# Patient Record
Sex: FEMALE | Race: WHITE | NOT HISPANIC OR LATINO
[De-identification: names, ages, dates, MRNs, and addresses within clinical notes are randomized per-mention and may not be internally consistent; named-entity substitution may affect disease eponyms.]

---

## 2017-02-28 ENCOUNTER — APPOINTMENT (OUTPATIENT)
Dept: INTERNAL MEDICINE | Facility: CLINIC | Age: 82
End: 2017-02-28

## 2017-02-28 VITALS
HEART RATE: 74 BPM | RESPIRATION RATE: 14 BRPM | SYSTOLIC BLOOD PRESSURE: 130 MMHG | DIASTOLIC BLOOD PRESSURE: 80 MMHG | TEMPERATURE: 98.3 F | HEIGHT: 68 IN | OXYGEN SATURATION: 98 %

## 2017-02-28 DIAGNOSIS — J06.9 ACUTE UPPER RESPIRATORY INFECTION, UNSPECIFIED: ICD-10-CM

## 2017-03-21 ENCOUNTER — APPOINTMENT (OUTPATIENT)
Dept: INTERNAL MEDICINE | Facility: CLINIC | Age: 82
End: 2017-03-21

## 2017-03-21 VITALS
RESPIRATION RATE: 14 BRPM | DIASTOLIC BLOOD PRESSURE: 60 MMHG | HEART RATE: 91 BPM | SYSTOLIC BLOOD PRESSURE: 110 MMHG | HEIGHT: 68 IN | OXYGEN SATURATION: 99 %

## 2017-03-21 DIAGNOSIS — R07.81 PLEURODYNIA: ICD-10-CM

## 2017-06-30 ENCOUNTER — APPOINTMENT (OUTPATIENT)
Dept: INTERNAL MEDICINE | Facility: CLINIC | Age: 82
End: 2017-06-30

## 2017-06-30 VITALS
BODY MASS INDEX: 21.98 KG/M2 | HEIGHT: 68 IN | DIASTOLIC BLOOD PRESSURE: 80 MMHG | WEIGHT: 145 LBS | RESPIRATION RATE: 14 BRPM | HEART RATE: 73 BPM | SYSTOLIC BLOOD PRESSURE: 130 MMHG | OXYGEN SATURATION: 97 % | TEMPERATURE: 98 F

## 2017-06-30 DIAGNOSIS — D64.9 ANEMIA, UNSPECIFIED: ICD-10-CM

## 2017-06-30 DIAGNOSIS — F03.90 UNSPECIFIED DEMENTIA W/OUT BEHAVIORAL DISTURBANCE: ICD-10-CM

## 2017-06-30 DIAGNOSIS — I25.10 ATHEROSCLEROTIC HEART DISEASE OF NATIVE CORONARY ARTERY W/OUT ANGINA PECTORIS: ICD-10-CM

## 2017-06-30 DIAGNOSIS — R60.0 LOCALIZED EDEMA: ICD-10-CM

## 2017-06-30 DIAGNOSIS — E78.5 HYPERLIPIDEMIA, UNSPECIFIED: ICD-10-CM

## 2017-10-22 ENCOUNTER — EMERGENCY (EMERGENCY)
Facility: HOSPITAL | Age: 82
LOS: 1 days | Discharge: HOME | End: 2017-10-22
Attending: EMERGENCY MEDICINE
Payer: MEDICARE

## 2017-10-22 VITALS
OXYGEN SATURATION: 98 % | HEART RATE: 62 BPM | SYSTOLIC BLOOD PRESSURE: 124 MMHG | DIASTOLIC BLOOD PRESSURE: 81 MMHG | RESPIRATION RATE: 14 BRPM | TEMPERATURE: 98 F

## 2017-10-22 VITALS
OXYGEN SATURATION: 95 % | DIASTOLIC BLOOD PRESSURE: 64 MMHG | TEMPERATURE: 97 F | SYSTOLIC BLOOD PRESSURE: 102 MMHG | RESPIRATION RATE: 16 BRPM | HEIGHT: 68 IN | WEIGHT: 190.04 LBS | HEART RATE: 82 BPM

## 2017-10-22 LAB
ALBUMIN SERPL ELPH-MCNC: 2.7 G/DL — LOW (ref 3.3–5)
ALP SERPL-CCNC: 60 U/L — SIGNIFICANT CHANGE UP (ref 30–120)
ALT FLD-CCNC: 23 U/L DA — SIGNIFICANT CHANGE UP (ref 10–60)
ANION GAP SERPL CALC-SCNC: 12 MMOL/L — SIGNIFICANT CHANGE UP (ref 5–17)
APTT BLD: 29.2 SEC — SIGNIFICANT CHANGE UP (ref 27.5–37.4)
AST SERPL-CCNC: 29 U/L — SIGNIFICANT CHANGE UP (ref 10–40)
BASOPHILS # BLD AUTO: 0.1 K/UL — SIGNIFICANT CHANGE UP (ref 0–0.2)
BASOPHILS NFR BLD AUTO: 0.8 % — SIGNIFICANT CHANGE UP (ref 0–2)
BILIRUB SERPL-MCNC: 1.1 MG/DL — SIGNIFICANT CHANGE UP (ref 0.2–1.2)
BUN SERPL-MCNC: 37 MG/DL — HIGH (ref 7–23)
CALCIUM SERPL-MCNC: 9.1 MG/DL — SIGNIFICANT CHANGE UP (ref 8.4–10.5)
CHLORIDE SERPL-SCNC: 102 MMOL/L — SIGNIFICANT CHANGE UP (ref 96–108)
CK SERPL-CCNC: 231 U/L — HIGH (ref 26–192)
CO2 SERPL-SCNC: 23 MMOL/L — SIGNIFICANT CHANGE UP (ref 22–31)
CREAT SERPL-MCNC: 1.36 MG/DL — HIGH (ref 0.5–1.3)
EOSINOPHIL # BLD AUTO: 0 K/UL — SIGNIFICANT CHANGE UP (ref 0–0.5)
EOSINOPHIL NFR BLD AUTO: 0.5 % — SIGNIFICANT CHANGE UP (ref 0–6)
GLUCOSE SERPL-MCNC: 135 MG/DL — HIGH (ref 70–99)
HCT VFR BLD CALC: 34.6 % — SIGNIFICANT CHANGE UP (ref 34.5–45)
HGB BLD-MCNC: 12.2 G/DL — SIGNIFICANT CHANGE UP (ref 11.5–15.5)
LIDOCAIN IGE QN: 175 U/L — SIGNIFICANT CHANGE UP (ref 73–393)
LYMPHOCYTES # BLD AUTO: 0.8 K/UL — LOW (ref 1–3.3)
LYMPHOCYTES # BLD AUTO: 7.3 % — LOW (ref 13–44)
MCHC RBC-ENTMCNC: 33.8 PG — SIGNIFICANT CHANGE UP (ref 27–34)
MCHC RBC-ENTMCNC: 35.1 GM/DL — SIGNIFICANT CHANGE UP (ref 32–36)
MCV RBC AUTO: 96 FL — SIGNIFICANT CHANGE UP (ref 80–100)
MONOCYTES # BLD AUTO: 1.4 K/UL — HIGH (ref 0–0.9)
MONOCYTES NFR BLD AUTO: 13.2 % — SIGNIFICANT CHANGE UP (ref 2–14)
NEUTROPHILS # BLD AUTO: 8.2 K/UL — HIGH (ref 1.8–7.4)
NEUTROPHILS NFR BLD AUTO: 78.2 % — HIGH (ref 43–77)
PLATELET # BLD AUTO: 166 K/UL — SIGNIFICANT CHANGE UP (ref 150–400)
POTASSIUM SERPL-MCNC: 3.9 MMOL/L — SIGNIFICANT CHANGE UP (ref 3.5–5.3)
POTASSIUM SERPL-SCNC: 3.9 MMOL/L — SIGNIFICANT CHANGE UP (ref 3.5–5.3)
PROT SERPL-MCNC: 6.2 G/DL — SIGNIFICANT CHANGE UP (ref 6–8.3)
RBC # BLD: 3.61 M/UL — LOW (ref 3.8–5.2)
RBC # FLD: 12.3 % — SIGNIFICANT CHANGE UP (ref 10.3–14.5)
SODIUM SERPL-SCNC: 137 MMOL/L — SIGNIFICANT CHANGE UP (ref 135–145)
TROPONIN I SERPL-MCNC: 0.04 NG/ML — SIGNIFICANT CHANGE UP (ref 0.02–0.06)
WBC # BLD: 10.5 K/UL — SIGNIFICANT CHANGE UP (ref 3.8–10.5)
WBC # FLD AUTO: 10.5 K/UL — SIGNIFICANT CHANGE UP (ref 3.8–10.5)

## 2017-10-22 PROCEDURE — 85730 THROMBOPLASTIN TIME PARTIAL: CPT

## 2017-10-22 PROCEDURE — 83690 ASSAY OF LIPASE: CPT

## 2017-10-22 PROCEDURE — 84484 ASSAY OF TROPONIN QUANT: CPT

## 2017-10-22 PROCEDURE — 85027 COMPLETE CBC AUTOMATED: CPT

## 2017-10-22 PROCEDURE — 36415 COLL VENOUS BLD VENIPUNCTURE: CPT

## 2017-10-22 PROCEDURE — 82550 ASSAY OF CK (CPK): CPT

## 2017-10-22 PROCEDURE — 99284 EMERGENCY DEPT VISIT MOD MDM: CPT

## 2017-10-22 PROCEDURE — 99283 EMERGENCY DEPT VISIT LOW MDM: CPT

## 2017-10-22 PROCEDURE — 80053 COMPREHEN METABOLIC PANEL: CPT

## 2017-10-22 RX ORDER — SODIUM CHLORIDE 9 MG/ML
3 INJECTION INTRAMUSCULAR; INTRAVENOUS; SUBCUTANEOUS ONCE
Qty: 0 | Refills: 0 | Status: COMPLETED | OUTPATIENT
Start: 2017-10-22 | End: 2017-10-22

## 2017-10-22 RX ORDER — SODIUM CHLORIDE 9 MG/ML
1000 INJECTION INTRAMUSCULAR; INTRAVENOUS; SUBCUTANEOUS ONCE
Qty: 0 | Refills: 0 | Status: COMPLETED | OUTPATIENT
Start: 2017-10-22 | End: 2017-10-22

## 2017-10-22 RX ADMIN — SODIUM CHLORIDE 3 MILLILITER(S): 9 INJECTION INTRAMUSCULAR; INTRAVENOUS; SUBCUTANEOUS at 16:49

## 2017-10-22 RX ADMIN — SODIUM CHLORIDE 5 MILLILITER(S): 9 INJECTION INTRAMUSCULAR; INTRAVENOUS; SUBCUTANEOUS at 15:06

## 2017-10-22 NOTE — ED ADULT NURSE NOTE - OBJECTIVE STATEMENT
89 year old female BIBA from home after personal aide called EMS as she states patient has had 4 days of diarrhea  Pt denies pain  no visisble stools noted in ER  Vital signs stable  afebrile   Abdomen soft non-distended  skin color WNL

## 2017-10-22 NOTE — ED PROVIDER NOTE - OBJECTIVE STATEMENT
89 y/o F pt with history of dementia, hyperlipidemia presents to the ED c/o diarrhea x4 days. Pt's friend tried giving her Imodium, with no relief, and states that yesterday pt was very lethargic. Denies vomiting, fever, abdominal pain. No further complaints at this time. History is limited due to pt's dementia.  PCP: Dr. Wetzel

## 2017-10-22 NOTE — ED PROVIDER NOTE - MEDICAL DECISION MAKING DETAILS
evaluate the hydration status of the patient r/o infectious diarrhea corollate with lab result and clinical finding.

## 2017-10-29 ENCOUNTER — INPATIENT (INPATIENT)
Facility: HOSPITAL | Age: 82
LOS: 4 days | Discharge: HOME | DRG: 372 | End: 2017-11-03
Attending: FAMILY MEDICINE | Admitting: FAMILY MEDICINE
Payer: MEDICARE

## 2017-10-29 VITALS
TEMPERATURE: 100 F | HEART RATE: 108 BPM | OXYGEN SATURATION: 100 % | WEIGHT: 175.05 LBS | DIASTOLIC BLOOD PRESSURE: 63 MMHG | RESPIRATION RATE: 18 BRPM | SYSTOLIC BLOOD PRESSURE: 110 MMHG | HEIGHT: 72 IN

## 2017-10-29 DIAGNOSIS — N39.0 URINARY TRACT INFECTION, SITE NOT SPECIFIED: ICD-10-CM

## 2017-10-29 DIAGNOSIS — N17.9 ACUTE KIDNEY FAILURE, UNSPECIFIED: ICD-10-CM

## 2017-10-29 DIAGNOSIS — I48.91 UNSPECIFIED ATRIAL FIBRILLATION: ICD-10-CM

## 2017-10-29 DIAGNOSIS — R62.7 ADULT FAILURE TO THRIVE: ICD-10-CM

## 2017-10-29 LAB
ALBUMIN SERPL ELPH-MCNC: 2.7 G/DL — LOW (ref 3.3–5)
ALP SERPL-CCNC: 68 U/L — SIGNIFICANT CHANGE UP (ref 30–120)
ALT FLD-CCNC: 31 U/L DA — SIGNIFICANT CHANGE UP (ref 10–60)
ANION GAP SERPL CALC-SCNC: 15 MMOL/L — SIGNIFICANT CHANGE UP (ref 5–17)
APPEARANCE UR: ABNORMAL
APTT BLD: 26.8 SEC — LOW (ref 27.5–37.4)
AST SERPL-CCNC: 32 U/L — SIGNIFICANT CHANGE UP (ref 10–40)
BACTERIA # UR AUTO: ABNORMAL
BASOPHILS # BLD AUTO: 0 K/UL — SIGNIFICANT CHANGE UP (ref 0–0.2)
BASOPHILS NFR BLD AUTO: 1 % — SIGNIFICANT CHANGE UP (ref 0–2)
BILIRUB SERPL-MCNC: 0.9 MG/DL — SIGNIFICANT CHANGE UP (ref 0.2–1.2)
BILIRUB UR-MCNC: ABNORMAL
BUN SERPL-MCNC: 42 MG/DL — HIGH (ref 7–23)
CALCIUM SERPL-MCNC: 8.7 MG/DL — SIGNIFICANT CHANGE UP (ref 8.4–10.5)
CHLORIDE SERPL-SCNC: 104 MMOL/L — SIGNIFICANT CHANGE UP (ref 96–108)
CK MB CFR SERPL CALC: 3 NG/ML — SIGNIFICANT CHANGE UP (ref 0–3.6)
CO2 SERPL-SCNC: 19 MMOL/L — LOW (ref 22–31)
COLOR SPEC: YELLOW — SIGNIFICANT CHANGE UP
CREAT SERPL-MCNC: 1.6 MG/DL — HIGH (ref 0.5–1.3)
DIFF PNL FLD: ABNORMAL
EOSINOPHIL # BLD AUTO: 0.1 K/UL — SIGNIFICANT CHANGE UP (ref 0–0.5)
EOSINOPHIL NFR BLD AUTO: 0.4 % — SIGNIFICANT CHANGE UP (ref 0–6)
EPI CELLS # UR: ABNORMAL
GIANT PLATELETS BLD QL SMEAR: PRESENT — SIGNIFICANT CHANGE UP
GLUCOSE SERPL-MCNC: 160 MG/DL — HIGH (ref 70–99)
GLUCOSE UR QL: NEGATIVE MG/DL — SIGNIFICANT CHANGE UP
GRAN CASTS # UR COMP ASSIST: ABNORMAL /LPF
HCT VFR BLD CALC: 38 % — SIGNIFICANT CHANGE UP (ref 34.5–45)
HGB BLD-MCNC: 13 G/DL — SIGNIFICANT CHANGE UP (ref 11.5–15.5)
HYALINE CASTS # UR AUTO: ABNORMAL /LPF
INR BLD: 1.22 RATIO — HIGH (ref 0.88–1.16)
KETONES UR-MCNC: ABNORMAL
LACTATE SERPL-SCNC: 1.5 MMOL/L — SIGNIFICANT CHANGE UP (ref 0.7–2)
LACTATE SERPL-SCNC: 2.5 MMOL/L — HIGH (ref 0.7–2)
LEUKOCYTE ESTERASE UR-ACNC: ABNORMAL
LYMPHOCYTES # BLD AUTO: 0.7 K/UL — LOW (ref 1–3.3)
LYMPHOCYTES # BLD AUTO: 2 % — LOW (ref 13–44)
MCHC RBC-ENTMCNC: 33 PG — SIGNIFICANT CHANGE UP (ref 27–34)
MCHC RBC-ENTMCNC: 34.2 GM/DL — SIGNIFICANT CHANGE UP (ref 32–36)
MCV RBC AUTO: 96.7 FL — SIGNIFICANT CHANGE UP (ref 80–100)
METAMYELOCYTES # FLD: 2 % — HIGH (ref 0–0)
MONOCYTES # BLD AUTO: 0.8 K/UL — SIGNIFICANT CHANGE UP (ref 0–0.9)
MONOCYTES NFR BLD AUTO: 2 % — SIGNIFICANT CHANGE UP (ref 2–14)
NEUTROPHILS # BLD AUTO: 27.3 K/UL — HIGH (ref 1.8–7.4)
NEUTROPHILS NFR BLD AUTO: 80 % — HIGH (ref 43–77)
NEUTS BAND # BLD: 13 % — HIGH (ref 0–8)
NITRITE UR-MCNC: NEGATIVE — SIGNIFICANT CHANGE UP
PH UR: 5 — SIGNIFICANT CHANGE UP (ref 5–8)
PLAT MORPH BLD: NORMAL — SIGNIFICANT CHANGE UP
PLATELET # BLD AUTO: 233 K/UL — SIGNIFICANT CHANGE UP (ref 150–400)
POTASSIUM SERPL-MCNC: 3.9 MMOL/L — SIGNIFICANT CHANGE UP (ref 3.5–5.3)
POTASSIUM SERPL-SCNC: 3.9 MMOL/L — SIGNIFICANT CHANGE UP (ref 3.5–5.3)
PROT SERPL-MCNC: 5.9 G/DL — LOW (ref 6–8.3)
PROT UR-MCNC: 30 MG/DL
PROTHROM AB SERPL-ACNC: 13.4 SEC — HIGH (ref 9.8–12.7)
RBC # BLD: 3.92 M/UL — SIGNIFICANT CHANGE UP (ref 3.8–5.2)
RBC # FLD: 12.8 % — SIGNIFICANT CHANGE UP (ref 10.3–14.5)
RBC BLD AUTO: NORMAL — SIGNIFICANT CHANGE UP
RBC CASTS # UR COMP ASSIST: ABNORMAL /HPF (ref 0–4)
SODIUM SERPL-SCNC: 138 MMOL/L — SIGNIFICANT CHANGE UP (ref 135–145)
SP GR SPEC: 1.01 — SIGNIFICANT CHANGE UP (ref 1.01–1.02)
TOXIC GRANULES BLD QL SMEAR: PRESENT — SIGNIFICANT CHANGE UP
TROPONIN I SERPL-MCNC: 0.08 NG/ML — HIGH (ref 0.02–0.06)
TROPONIN I SERPL-MCNC: 0.16 NG/ML — HIGH (ref 0.02–0.06)
UROBILINOGEN FLD QL: 4 MG/DL
WBC # BLD: 29 K/UL — HIGH (ref 3.8–10.5)
WBC # FLD AUTO: 29 K/UL — HIGH (ref 3.8–10.5)
WBC UR QL: SIGNIFICANT CHANGE UP

## 2017-10-29 PROCEDURE — 99285 EMERGENCY DEPT VISIT HI MDM: CPT

## 2017-10-29 PROCEDURE — 99223 1ST HOSP IP/OBS HIGH 75: CPT | Mod: AI

## 2017-10-29 PROCEDURE — 71010: CPT | Mod: 26

## 2017-10-29 PROCEDURE — 70450 CT HEAD/BRAIN W/O DYE: CPT | Mod: 26

## 2017-10-29 PROCEDURE — 93010 ELECTROCARDIOGRAM REPORT: CPT

## 2017-10-29 PROCEDURE — 99231 SBSQ HOSP IP/OBS SF/LOW 25: CPT | Mod: 25

## 2017-10-29 RX ORDER — CEFTRIAXONE 500 MG/1
1 INJECTION, POWDER, FOR SOLUTION INTRAMUSCULAR; INTRAVENOUS EVERY 24 HOURS
Qty: 0 | Refills: 0 | Status: DISCONTINUED | OUTPATIENT
Start: 2017-10-30 | End: 2017-11-01

## 2017-10-29 RX ORDER — ATORVASTATIN CALCIUM 80 MG/1
1 TABLET, FILM COATED ORAL
Qty: 0 | Refills: 0 | COMMUNITY

## 2017-10-29 RX ORDER — METOPROLOL TARTRATE 50 MG
5 TABLET ORAL ONCE
Qty: 0 | Refills: 0 | Status: COMPLETED | OUTPATIENT
Start: 2017-10-29 | End: 2017-10-29

## 2017-10-29 RX ORDER — TIMOLOL 0.5 %
1 DROPS OPHTHALMIC (EYE)
Qty: 0 | Refills: 0 | Status: DISCONTINUED | OUTPATIENT
Start: 2017-10-29 | End: 2017-11-03

## 2017-10-29 RX ORDER — LATANOPROST 0.05 MG/ML
1 SOLUTION/ DROPS OPHTHALMIC; TOPICAL AT BEDTIME
Qty: 0 | Refills: 0 | Status: DISCONTINUED | OUTPATIENT
Start: 2017-10-29 | End: 2017-11-03

## 2017-10-29 RX ORDER — ONDANSETRON 8 MG/1
4 TABLET, FILM COATED ORAL EVERY 6 HOURS
Qty: 0 | Refills: 0 | Status: DISCONTINUED | OUTPATIENT
Start: 2017-10-29 | End: 2017-11-03

## 2017-10-29 RX ORDER — CEFTRIAXONE 500 MG/1
1 INJECTION, POWDER, FOR SOLUTION INTRAMUSCULAR; INTRAVENOUS ONCE
Qty: 0 | Refills: 0 | Status: COMPLETED | OUTPATIENT
Start: 2017-10-29 | End: 2017-10-29

## 2017-10-29 RX ORDER — METOPROLOL TARTRATE 50 MG
5 TABLET ORAL EVERY 6 HOURS
Qty: 0 | Refills: 0 | Status: DISCONTINUED | OUTPATIENT
Start: 2017-10-29 | End: 2017-10-30

## 2017-10-29 RX ORDER — TIMOLOL 0.5 %
0 DROPS OPHTHALMIC (EYE)
Qty: 0 | Refills: 0 | COMMUNITY

## 2017-10-29 RX ORDER — PANTOPRAZOLE SODIUM 20 MG/1
40 TABLET, DELAYED RELEASE ORAL
Qty: 0 | Refills: 0 | Status: DISCONTINUED | OUTPATIENT
Start: 2017-10-29 | End: 2017-10-30

## 2017-10-29 RX ORDER — SODIUM CHLORIDE 9 MG/ML
1000 INJECTION, SOLUTION INTRAVENOUS
Qty: 0 | Refills: 0 | Status: DISCONTINUED | OUTPATIENT
Start: 2017-10-29 | End: 2017-10-29

## 2017-10-29 RX ORDER — ATORVASTATIN CALCIUM 80 MG/1
40 TABLET, FILM COATED ORAL AT BEDTIME
Qty: 0 | Refills: 0 | Status: DISCONTINUED | OUTPATIENT
Start: 2017-10-29 | End: 2017-11-03

## 2017-10-29 RX ORDER — LATANOPROST 0.05 MG/ML
0 SOLUTION/ DROPS OPHTHALMIC; TOPICAL
Qty: 0 | Refills: 0 | COMMUNITY

## 2017-10-29 RX ORDER — ASCORBIC ACID 60 MG
1 TABLET,CHEWABLE ORAL
Qty: 0 | Refills: 0 | COMMUNITY

## 2017-10-29 RX ORDER — SODIUM CHLORIDE 9 MG/ML
1000 INJECTION, SOLUTION INTRAVENOUS
Qty: 0 | Refills: 0 | Status: DISCONTINUED | OUTPATIENT
Start: 2017-10-29 | End: 2017-10-30

## 2017-10-29 RX ORDER — HEPARIN SODIUM 5000 [USP'U]/ML
5000 INJECTION INTRAVENOUS; SUBCUTANEOUS EVERY 12 HOURS
Qty: 0 | Refills: 0 | Status: DISCONTINUED | OUTPATIENT
Start: 2017-10-29 | End: 2017-10-29

## 2017-10-29 RX ORDER — SODIUM CHLORIDE 9 MG/ML
1000 INJECTION INTRAMUSCULAR; INTRAVENOUS; SUBCUTANEOUS
Qty: 0 | Refills: 0 | Status: COMPLETED | OUTPATIENT
Start: 2017-10-29 | End: 2017-10-29

## 2017-10-29 RX ORDER — MULTIVIT-MIN/FERROUS GLUCONATE 9 MG/15 ML
1 LIQUID (ML) ORAL
Qty: 0 | Refills: 0 | COMMUNITY

## 2017-10-29 RX ORDER — ASPIRIN/CALCIUM CARB/MAGNESIUM 324 MG
81 TABLET ORAL DAILY
Qty: 0 | Refills: 0 | Status: DISCONTINUED | OUTPATIENT
Start: 2017-10-29 | End: 2017-10-29

## 2017-10-29 RX ADMIN — PANTOPRAZOLE SODIUM 40 MILLIGRAM(S): 20 TABLET, DELAYED RELEASE ORAL at 17:38

## 2017-10-29 RX ADMIN — Medication 5 MILLIGRAM(S): at 18:19

## 2017-10-29 RX ADMIN — SODIUM CHLORIDE 1000 MILLILITER(S): 9 INJECTION INTRAMUSCULAR; INTRAVENOUS; SUBCUTANEOUS at 12:36

## 2017-10-29 RX ADMIN — Medication 81 MILLIGRAM(S): at 17:38

## 2017-10-29 RX ADMIN — CEFTRIAXONE 100 GRAM(S): 500 INJECTION, POWDER, FOR SOLUTION INTRAMUSCULAR; INTRAVENOUS at 12:04

## 2017-10-29 RX ADMIN — ATORVASTATIN CALCIUM 40 MILLIGRAM(S): 80 TABLET, FILM COATED ORAL at 22:02

## 2017-10-29 RX ADMIN — Medication 5 MILLIGRAM(S): at 14:50

## 2017-10-29 RX ADMIN — SODIUM CHLORIDE 1000 MILLILITER(S): 9 INJECTION INTRAMUSCULAR; INTRAVENOUS; SUBCUTANEOUS at 11:48

## 2017-10-29 RX ADMIN — Medication 1 DROP(S): at 18:20

## 2017-10-29 RX ADMIN — LATANOPROST 1 DROP(S): 0.05 SOLUTION/ DROPS OPHTHALMIC; TOPICAL at 22:02

## 2017-10-29 RX ADMIN — SODIUM CHLORIDE 100 MILLILITER(S): 9 INJECTION, SOLUTION INTRAVENOUS at 20:14

## 2017-10-29 RX ADMIN — SODIUM CHLORIDE 1000 MILLILITER(S): 9 INJECTION INTRAMUSCULAR; INTRAVENOUS; SUBCUTANEOUS at 11:47

## 2017-10-29 RX ADMIN — HEPARIN SODIUM 5000 UNIT(S): 5000 INJECTION INTRAVENOUS; SUBCUTANEOUS at 18:20

## 2017-10-29 RX ADMIN — SODIUM CHLORIDE 100 MILLILITER(S): 9 INJECTION, SOLUTION INTRAVENOUS at 18:20

## 2017-10-29 NOTE — ED ADULT NURSE NOTE - INTEGUMENTARY WDL
Color consistent with ethnicity/race, warm, dry intact, resilient. decubitus as below and in primary note

## 2017-10-29 NOTE — H&P ADULT - HISTORY OF PRESENT ILLNESS
Patient is 89 yo female with hx of CAD s/P stent, and dementia presenting with failure to thrive and generalized weakness.  afebrile.  Patient appears to be confused, and unable to obtain complete hx or ROS.  HX was mainly from son, and daughter. Patient is 89 yo female with hx of CAD s/P stent, and dementia presenting with failure to thrive and generalized weakness.  afebrile.  Patient appears to be confused, and unable to obtain complete hx or ROS.  HX was mainly from son, and daughter.    As per daughter, patient had diarrhea 2 weeks ago which improved, and almost resolved.

## 2017-10-29 NOTE — ED ADULT NURSE REASSESSMENT NOTE - NS ED NURSE REASSESS COMMENT FT1
from [facility] in [facility] with pt and family pt pending bed
attempted to feed pt but locks lips and pushing food away  and  yells go away  attempted to cajole but still refusing food
bladder scan used 25 cc only endorsed to nights
pt clean and dry pillow to side pt keeps rolling to right side pt fed applesauce and tolerated monitor a fib pt pending inpt bed
pt cleaned and changed  lotion to buttocks incontinent of feces lungs clear and equal b/l ascultation pt pending admission iv intact no s/s infiltration monitor a fib 90 to 100's
pt comfortable skin warm and dry denies any pains pt pending admission awaiting fluid bolus before repeating lactate
pt incontinent of feces formed green/brown pt cleaned and changed and lotion applied re positioned attempted to feed and spits out pt pending admission awaiting inpt bed  pt denies pains
pt returned from xray ekg rhythm changed and ekg repeated  a fib 120's pt straight cathed for urine concentrate urine  sent to lab pt pending labs and dispo
blood in mouth oral suction done seen by dr dimas admitted to icu

## 2017-10-29 NOTE — H&P ADULT - RS GEN PE MLT RESP DETAILS PC
good air movement/breath sounds equal/respirations non-labored/airway patent/clear to auscultation bilaterally/normal

## 2017-10-29 NOTE — H&P ADULT - GASTROINTESTINAL DETAILS
no bruit/nontender/no masses palpable/soft/bowel sounds normal/no rebound tenderness/no distention/normal

## 2017-10-29 NOTE — ED ADULT NURSE NOTE - FALL HARM RISK
other/age(85 years old or older)/mental status/bones(Osteoporosis,prev fx,steroid use,metastatic bone ca

## 2017-10-29 NOTE — CHART NOTE - NSCHARTNOTEFT_GEN_A_CORE
RN called to evaluate episode of oral bleed - etiology unclear. seems self-limited. pt refusing full oral exam.   limited exam showed some dried blood on tongue, poor dentition. patient not cooperative with tongue depressor exam due to dementia.   stopped aspirin/heparin for now. aspiration precautions. continue NPO.  SP[facility] monitoring.  d/w RN.  patient is DNR.

## 2017-10-29 NOTE — ED PROVIDER NOTE - CONSTITUTIONAL, MLM
normal... Well appearing, well nourished, awake, lethargic, oriented to person only and in no apparent distress.

## 2017-10-29 NOTE — ED ADULT NURSE NOTE - OBJECTIVE STATEMENT
pt brought in for weakness per ems incontinent of feces and urine on arrival sacrum redness with tiny cracks to skin and purplish excoriation to inner buttocks cheeks pt cleaned and changed and lotion to buttocks and genitalia pt with b/l +1 pedal edema pt brought in for weakness per ems incontinent of feces and urine on arrival sacrum redness with tiny cracks to skin and purplish excoriation to inner buttocks cheeks pt cleaned and changed and lotion to buttocks and genitalia pt with b/l +1 pedal edema to ankles discoloration  to both lower legs left more than right bruising to right forearm no pains  abrasion to left eyebrow perrl pt aaox2 not to date and situation skin warm and dry no resp distress lungs clear and equal b/l ascultation abd soft non tender + bs pt denies pains

## 2017-10-29 NOTE — ED PROVIDER NOTE - MEDICAL DECISION MAKING DETAILS
89 yo female with dementia with severe weakness today. PE nonfocal. Plan - labs, cxr, ct brain, ekg, IV fluids, likely admit.

## 2017-10-29 NOTE — ED PROVIDER NOTE - OBJECTIVE STATEMENT
Brought by ambulance from home with co weakness and almost passed out today. Pt with dementia cannot give hx. Denies any symptoms when asked. Recently seen in ER for diarrhea.

## 2017-10-29 NOTE — H&P ADULT - PROBLEM SELECTOR PLAN 3
Will start patient on PO Cardizem 30 mg po q6hrs for rate control.  poor candidate for long term anticoagulation due to risk of fall, and advanced age.  Continue with ASA.  Obtain [facility] TSH, and cardiology consult Will start patient on IV metoprolol q6hrs for rate control.  poor candidate for long term anticoagulation due to risk of fall, and advanced age.  Continue with ASA.  Obtain [facility] TSH, and cardiology consult

## 2017-10-29 NOTE — ED PROVIDER NOTE - MUSCULOSKELETAL, MLM
Spine appears normal, range of motion is not limited, no muscle or joint tenderness. Pitting edema both lower legs.

## 2017-10-29 NOTE — H&P ADULT - ATTENDING COMMENTS
Plan of care was discussed with son/daughter/HCP in great details, All questions were answered to their satisfication.  Seems to understand, and in agreement  Code status DNR/DNI as per family's wishes

## 2017-10-30 LAB
ANION GAP SERPL CALC-SCNC: 9 MMOL/L — SIGNIFICANT CHANGE UP (ref 5–17)
BUN SERPL-MCNC: 34 MG/DL — HIGH (ref 7–23)
CALCIUM SERPL-MCNC: 8.3 MG/DL — LOW (ref 8.4–10.5)
CHLORIDE SERPL-SCNC: 111 MMOL/L — HIGH (ref 96–108)
CO2 SERPL-SCNC: 22 MMOL/L — SIGNIFICANT CHANGE UP (ref 22–31)
CREAT SERPL-MCNC: 1.12 MG/DL — SIGNIFICANT CHANGE UP (ref 0.5–1.3)
CULTURE RESULTS: SIGNIFICANT CHANGE UP
GLUCOSE SERPL-MCNC: 152 MG/DL — HIGH (ref 70–99)
HCT VFR BLD CALC: 37.3 % — SIGNIFICANT CHANGE UP (ref 34.5–45)
HCT VFR BLD CALC: 38.2 % — SIGNIFICANT CHANGE UP (ref 34.5–45)
HGB BLD-MCNC: 12.7 G/DL — SIGNIFICANT CHANGE UP (ref 11.5–15.5)
HGB BLD-MCNC: 13.5 G/DL — SIGNIFICANT CHANGE UP (ref 11.5–15.5)
MCHC RBC-ENTMCNC: 33.5 PG — SIGNIFICANT CHANGE UP (ref 27–34)
MCHC RBC-ENTMCNC: 34.1 GM/DL — SIGNIFICANT CHANGE UP (ref 32–36)
MCHC RBC-ENTMCNC: 34.3 PG — HIGH (ref 27–34)
MCHC RBC-ENTMCNC: 35.3 GM/DL — SIGNIFICANT CHANGE UP (ref 32–36)
MCV RBC AUTO: 97.3 FL — SIGNIFICANT CHANGE UP (ref 80–100)
MCV RBC AUTO: 98.2 FL — SIGNIFICANT CHANGE UP (ref 80–100)
PLATELET # BLD AUTO: 211 K/UL — SIGNIFICANT CHANGE UP (ref 150–400)
PLATELET # BLD AUTO: 212 K/UL — SIGNIFICANT CHANGE UP (ref 150–400)
POTASSIUM SERPL-MCNC: 4.1 MMOL/L — SIGNIFICANT CHANGE UP (ref 3.5–5.3)
POTASSIUM SERPL-SCNC: 4.1 MMOL/L — SIGNIFICANT CHANGE UP (ref 3.5–5.3)
RBC # BLD: 3.8 M/UL — SIGNIFICANT CHANGE UP (ref 3.8–5.2)
RBC # BLD: 3.93 M/UL — SIGNIFICANT CHANGE UP (ref 3.8–5.2)
RBC # FLD: 12.6 % — SIGNIFICANT CHANGE UP (ref 10.3–14.5)
RBC # FLD: 12.8 % — SIGNIFICANT CHANGE UP (ref 10.3–14.5)
SODIUM SERPL-SCNC: 142 MMOL/L — SIGNIFICANT CHANGE UP (ref 135–145)
SPECIMEN SOURCE: SIGNIFICANT CHANGE UP
TROPONIN I SERPL-MCNC: 0.56 NG/ML — HIGH (ref 0.02–0.06)
TROPONIN I SERPL-MCNC: 0.66 NG/ML — HIGH (ref 0.02–0.06)
TSH SERPL-MCNC: 0.55 UIU/ML — SIGNIFICANT CHANGE UP (ref 0.27–4.2)
WBC # BLD: 19.1 K/UL — HIGH (ref 3.8–10.5)
WBC # BLD: 27.8 K/UL — HIGH (ref 3.8–10.5)
WBC # FLD AUTO: 19.1 K/UL — HIGH (ref 3.8–10.5)
WBC # FLD AUTO: 27.8 K/UL — HIGH (ref 3.8–10.5)

## 2017-10-30 PROCEDURE — 93306 TTE W/DOPPLER COMPLETE: CPT | Mod: 26

## 2017-10-30 PROCEDURE — 99233 SBSQ HOSP IP/OBS HIGH 50: CPT

## 2017-10-30 RX ORDER — METOPROLOL TARTRATE 50 MG
25 TABLET ORAL
Qty: 0 | Refills: 0 | Status: DISCONTINUED | OUTPATIENT
Start: 2017-10-30 | End: 2017-10-30

## 2017-10-30 RX ORDER — PANTOPRAZOLE SODIUM 20 MG/1
40 TABLET, DELAYED RELEASE ORAL DAILY
Qty: 0 | Refills: 0 | Status: DISCONTINUED | OUTPATIENT
Start: 2017-10-30 | End: 2017-11-01

## 2017-10-30 RX ORDER — SODIUM CHLORIDE 9 MG/ML
1000 INJECTION, SOLUTION INTRAVENOUS
Qty: 0 | Refills: 0 | Status: DISCONTINUED | OUTPATIENT
Start: 2017-10-30 | End: 2017-10-30

## 2017-10-30 RX ORDER — METOPROLOL TARTRATE 50 MG
5 TABLET ORAL EVERY 6 HOURS
Qty: 0 | Refills: 0 | Status: DISCONTINUED | OUTPATIENT
Start: 2017-10-30 | End: 2017-11-01

## 2017-10-30 RX ORDER — SODIUM CHLORIDE 9 MG/ML
1000 INJECTION INTRAMUSCULAR; INTRAVENOUS; SUBCUTANEOUS
Qty: 0 | Refills: 0 | Status: DISCONTINUED | OUTPATIENT
Start: 2017-10-30 | End: 2017-10-31

## 2017-10-30 RX ADMIN — Medication 5 MILLIGRAM(S): at 23:57

## 2017-10-30 RX ADMIN — Medication 1 DROP(S): at 06:29

## 2017-10-30 RX ADMIN — Medication 5 MILLIGRAM(S): at 06:28

## 2017-10-30 RX ADMIN — PANTOPRAZOLE SODIUM 40 MILLIGRAM(S): 20 TABLET, DELAYED RELEASE ORAL at 11:36

## 2017-10-30 RX ADMIN — SODIUM CHLORIDE 100 MILLILITER(S): 9 INJECTION, SOLUTION INTRAVENOUS at 06:40

## 2017-10-30 RX ADMIN — CEFTRIAXONE 100 GRAM(S): 500 INJECTION, POWDER, FOR SOLUTION INTRAMUSCULAR; INTRAVENOUS at 06:28

## 2017-10-30 RX ADMIN — LATANOPROST 1 DROP(S): 0.05 SOLUTION/ DROPS OPHTHALMIC; TOPICAL at 21:08

## 2017-10-30 RX ADMIN — Medication 5 MILLIGRAM(S): at 17:11

## 2017-10-30 RX ADMIN — Medication 5 MILLIGRAM(S): at 11:36

## 2017-10-30 RX ADMIN — SODIUM CHLORIDE 40 MILLILITER(S): 9 INJECTION INTRAMUSCULAR; INTRAVENOUS; SUBCUTANEOUS at 11:36

## 2017-10-30 RX ADMIN — Medication 1 DROP(S): at 17:10

## 2017-10-30 RX ADMIN — Medication 5 MILLIGRAM(S): at 00:08

## 2017-10-30 NOTE — PROGRESS NOTE ADULT - ASSESSMENT
The patient is a 89 year old female with a history of CAD s/p PCI, chronic AF, dementia who presents with weakness and near syncope, found to have UTI, GRACIELA, rapid AF.    Plan:  - Transition to metoprolol 25 mg bid. Appears mainly in sinus rhythm with frequent PACs and frequent brief runs of AF.  - Continue IVF. However, careful use as patient prone to retaining water.  - Echocardiogram with normal LV systolic function, severe AS.  - Not a candidate for valve intervention  - Not a candidate for long-term AC given advanced dementia  - Off of aspirin due to possible bleed  - Hold ramipril given GRACIELA

## 2017-10-30 NOTE — DIETITIAN INITIAL EVALUATION ADULT. - PROBLEM SELECTOR PLAN 3
Will start patient on IV metoprolol q6hrs for rate control.  poor candidate for long term anticoagulation due to risk of fall, and advanced age.  Continue with ASA.  Obtain [facility] TSH, and cardiology consult

## 2017-10-30 NOTE — PROGRESS NOTE ADULT - SUBJECTIVE AND OBJECTIVE BOX
Patient appears to be pleasantly confused.  agitated at time.  Developed oral bleeding overnight, resolved now.  Unable to obtain HX or ROS due to underline dementia.    reports that she feels ok        MEDICATIONS  (STANDING):  atorvastatin 40 milliGRAM(s) Oral at bedtime  cefTRIAXone   IVPB 1 Gram(s) IV Intermittent every 24 hours  dextrose 5% + sodium chloride 0.9%. 1000 milliLiter(s) (100 mL/Hr) IV Continuous <Continuous>  latanoprost 0.005% Ophthalmic Solution 1 Drop(s) Both EYES at bedtime  metoprolol    tartrate Injectable 5 milliGRAM(s) IV Push every 6 hours  pantoprazole  Injectable 40 milliGRAM(s) IV Push daily  timolol 0.25% Solution 1 Drop(s) Both EYES two times a day    MEDICATIONS  (PRN):  ondansetron Injectable 4 milliGRAM(s) IV Push every 6 hours PRN Nausea        Vital Signs Last 24 Hrs  T(C): 36.7 (30 Oct 2017 08:00), Max: 37.7 (29 Oct 2017 10:28)  T(F): 98 (30 Oct 2017 08:00), Max: 99.9 (29 Oct 2017 10:28)  HR: 82 (30 Oct 2017 10:11) (82 - 132)  BP: 119/91 (30 Oct 2017 10:11) (110/63 - 159/83)  BP(mean): 99 (30 Oct 2017 10:11) (78 - 111)  RR: 22 (30 Oct 2017 10:11) (14 - 28)  SpO2: 98% (30 Oct 2017 10:11) (90% - 100%)        PHYSICAL EXAM-  GENERAL: NAD, well-groomed, well-developed  HEAD:  Atraumatic, Normocephalic  EYES: EOMI, PERRLA, conjunctiva and sclera clear  NECK: Supple, No JVD, Normal thyroid  NERVOUS SYSTEM:  Alert & Oriented X3, Moving all extremities  CHEST/LUNG: Clear to percussion bilaterally; No rales, rhonchi, wheezing, or rubs  HEART: Regular rate and rhythm; No murmurs, rubs, or gallops  ABDOMEN: Soft, Nontender, Nondistended; Bowel sounds present  SKIN: No rashes or lesions                          12.7   19.1  )-----------( 212      ( 30 Oct 2017 07:18 )             37.3     10-30    142  |  111<H>  |  34<H>  ----------------------------<  152<H>  4.1   |  22  |  1.12    Ca    8.3<L>      30 Oct 2017 07:18    TPro  5.9<L>  /  Alb  2.7<L>  /  TBili  0.9  /  DBili  x   /  AST  32  /  ALT  31  /  AlkPhos  68  10-    CARDIAC MARKERS ( 30 Oct 2017 07:18 )  .563 ng/mL / x     / x     / x     / x      CARDIAC MARKERS ( 29 Oct 2017 22:11 )  .159 ng/mL / x     / x     / x     / x      CARDIAC MARKERS ( 29 Oct 2017 11:24 )  .080 ng/mL / x     / x     / x     / 3.0 ng/mL        Urinalysis Basic - ( 29 Oct 2017 11:52 )    Color: Yellow / Appearance: Slightly Turbid / S.015 / pH: x  Gluc: x / Ketone: Trace  / Bili: Moderate / Urobili: 4 mg/dL   Blood: x / Protein: 30 mg/dL / Nitrite: Negative   Leuk Esterase: Small / RBC: 25-50 /HPF / WBC 3-5   Sq Epi: x / Non Sq Epi: Moderate / Bacteria: Moderate      PT/INR - ( 29 Oct 2017 11:24 )   PT: 13.4 sec;   INR: 1.22 ratio         PTT - ( 29 Oct 2017 11:24 )  PTT:26.8 sec

## 2017-10-30 NOTE — PROGRESS NOTE ADULT - SUBJECTIVE AND OBJECTIVE BOX
Chief Complaint: Weakness, confusion    Interval Events: Possible bleeding overnight. No complaints this morning.    Review of Systems:  General: No fevers, chills, weight loss or gain  Skin: No rashes, color changes  Cardiovascular: No chest pain, orthopnea  Respiratory: No shortness of breath, cough  Gastrointestinal: No nausea, abdominal pain  Genitourinary: No incontinence, pain with urination  Musculoskeletal: No pain, swelling, decreased range of motion  Neurological: No headache, weakness  Psychiatric: No depression, anxiety  Endocrine: No weight loss or gain, increased thirst  All other systems are comprehensively negative.    Physical Exam:  Vitals:        Vital Signs Last 24 Hrs  T(C): 36.7 (30 Oct 2017 08:00), Max: 37.7 (29 Oct 2017 10:28)  T(F): 98 (30 Oct 2017 08:00), Max: 99.9 (29 Oct 2017 10:28)  HR: 107 (30 Oct 2017 08:01) (87 - 132)  BP: 149/94 (30 Oct 2017 08:01) (110/63 - 159/83)  BP(mean): 110 (30 Oct 2017 08:01) (78 - 111)  RR: 14 (30 Oct 2017 08:01) (14 - 28)  SpO2: 97% (30 Oct 2017 08:01) (90% - 100%)  General: NAD  HEENT: MMM  Neck: No JVD, no carotid bruit  Lungs: CTAB  CV: RRR, nl S1/S2, 2/6 systolic murmur  Abdomen: S/NT/ND, +BS  Extremities: 1+ LE edema, no cyanosis  Neuro: AAOx3, non-focal  Skin: No rash    Labs:                        12.7   19.1  )-----------( 212      ( 30 Oct 2017 07:18 )             37.3     10-30    142  |  111<H>  |  34<H>  ----------------------------<  152<H>  4.1   |  22  |  1.12    Ca    8.3<L>      30 Oct 2017 07:18    TPro  5.9<L>  /  Alb  2.7<L>  /  TBili  0.9  /  DBili  x   /  AST  32  /  ALT  31  /  AlkPhos  68  10-29    CARDIAC MARKERS ( 30 Oct 2017 07:18 )  .563 ng/mL / x     / x     / x     / x      CARDIAC MARKERS ( 29 Oct 2017 22:11 )  .159 ng/mL / x     / x     / x     / x      CARDIAC MARKERS ( 29 Oct 2017 11:24 )  .080 ng/mL / x     / x     / x     / 3.0 ng/mL      PT/INR - ( 29 Oct 2017 11:24 )   PT: 13.4 sec;   INR: 1.22 ratio         PTT - ( 29 Oct 2017 11:24 )  PTT:26.8 sec    Telemetry: Sinus rhythm, PACs, frequent brief runs of AF

## 2017-10-30 NOTE — PROGRESS NOTE ADULT - ATTENDING COMMENTS
Plan of care was discussed with son/daughter/HCP in great details, All questions were answered to their satisfaction.  Seems to understand, and in agreement  Code status DNR/DNI as per family's wishes

## 2017-10-30 NOTE — SWALLOW BEDSIDE ASSESSMENT ADULT - COMMENTS
Pt alert, confused, dried blood and xerostomia noted. Oral care and suctioning provided.  No swallow triggered during oral care.  Pt admitted with failure to thrive.  Pt cannot follow directions and is confused. She was trialed with puree consistencies.  Pt with reduced orientation to spoon, reduced oral grading, no manipulation of the bolus, no AP transport, no swallow triggered.  Pt cannot tolerate the most conservative consistencies at this time. Discussed with RN.

## 2017-10-30 NOTE — DIETITIAN INITIAL EVALUATION ADULT. - OTHER INFO
89F adm from home c UTI and dehydration. Per son at bedside, pt has had diarrhea for the past 2 weeks. Son states pt appears that her weight has been generally stable but per his sister, pt has been having gradual wt loss over the years as shes gotten older. Son states pt has 2 different HHA that care for her (cook meals, assist c feedings)- son states that the aides have not been following modified diet pta to decrease bouts of diarrhea (i.e. giving pt greasy soup). Per discussion c son, F&N to provide blander foods to prevent diarrhea exacerbation. SLP consult pending; npo status maintained c d5nacl@100ml/hr. Skin: sacrum stage II. On vit c, iron, mvi pta. Oral bleeding overnight noted.

## 2017-10-30 NOTE — PROGRESS NOTE ADULT - PROBLEM SELECTOR PLAN 3
Continue with BB, and Monitor HR.  Currently in NSR.  Poor candidate for anticoagulation due to Advanced age, Fall risk, and Recent bleed.  .  Echo, and  TSH level are pending.  Cardiology to follow up Continue with BB, and Monitor HR.  Currently in NSR.  Poor candidate for anticoagulation due to Advanced age, Fall risk, and Recent bleed.  .  Echo, and  TSH level are pending.  Cardiology to follow up    -abnormal trop- probably due to demand mismatch ischemia in the setting of atrial fib with RVR, and ARF.  TTE pending.  EKG shows no ischemic changes.  Monitor trop level

## 2017-10-31 DIAGNOSIS — R19.7 DIARRHEA, UNSPECIFIED: ICD-10-CM

## 2017-10-31 DIAGNOSIS — R58 HEMORRHAGE, NOT ELSEWHERE CLASSIFIED: ICD-10-CM

## 2017-10-31 DIAGNOSIS — N17.9 ACUTE KIDNEY FAILURE, UNSPECIFIED: ICD-10-CM

## 2017-10-31 DIAGNOSIS — D72.829 ELEVATED WHITE BLOOD CELL COUNT, UNSPECIFIED: ICD-10-CM

## 2017-10-31 LAB
ANION GAP SERPL CALC-SCNC: 12 MMOL/L — SIGNIFICANT CHANGE UP (ref 5–17)
BUN SERPL-MCNC: 25 MG/DL — HIGH (ref 7–23)
CALCIUM SERPL-MCNC: 8.1 MG/DL — LOW (ref 8.4–10.5)
CHLORIDE SERPL-SCNC: 113 MMOL/L — HIGH (ref 96–108)
CO2 SERPL-SCNC: 18 MMOL/L — LOW (ref 22–31)
CREAT SERPL-MCNC: 0.74 MG/DL — SIGNIFICANT CHANGE UP (ref 0.5–1.3)
GLUCOSE SERPL-MCNC: 116 MG/DL — HIGH (ref 70–99)
HCT VFR BLD CALC: 37.9 % — SIGNIFICANT CHANGE UP (ref 34.5–45)
HGB BLD-MCNC: 13.4 G/DL — SIGNIFICANT CHANGE UP (ref 11.5–15.5)
MCHC RBC-ENTMCNC: 34 PG — SIGNIFICANT CHANGE UP (ref 27–34)
MCHC RBC-ENTMCNC: 35.4 GM/DL — SIGNIFICANT CHANGE UP (ref 32–36)
MCV RBC AUTO: 95.9 FL — SIGNIFICANT CHANGE UP (ref 80–100)
PLATELET # BLD AUTO: 184 K/UL — SIGNIFICANT CHANGE UP (ref 150–400)
POTASSIUM SERPL-MCNC: 3.4 MMOL/L — LOW (ref 3.5–5.3)
POTASSIUM SERPL-SCNC: 3.4 MMOL/L — LOW (ref 3.5–5.3)
RBC # BLD: 3.96 M/UL — SIGNIFICANT CHANGE UP (ref 3.8–5.2)
RBC # FLD: 12.6 % — SIGNIFICANT CHANGE UP (ref 10.3–14.5)
SODIUM SERPL-SCNC: 143 MMOL/L — SIGNIFICANT CHANGE UP (ref 135–145)
TROPONIN I SERPL-MCNC: 0.43 NG/ML — HIGH (ref 0.02–0.06)
WBC # BLD: 20.1 K/UL — HIGH (ref 3.8–10.5)
WBC # FLD AUTO: 20.1 K/UL — HIGH (ref 3.8–10.5)

## 2017-10-31 PROCEDURE — 99233 SBSQ HOSP IP/OBS HIGH 50: CPT

## 2017-10-31 PROCEDURE — 71010: CPT | Mod: 26

## 2017-10-31 RX ORDER — METRONIDAZOLE 500 MG
TABLET ORAL
Qty: 0 | Refills: 0 | Status: DISCONTINUED | OUTPATIENT
Start: 2017-10-31 | End: 2017-11-01

## 2017-10-31 RX ORDER — METRONIDAZOLE 500 MG
500 TABLET ORAL ONCE
Qty: 0 | Refills: 0 | Status: COMPLETED | OUTPATIENT
Start: 2017-10-31 | End: 2017-10-31

## 2017-10-31 RX ORDER — POTASSIUM CHLORIDE 20 MEQ
10 PACKET (EA) ORAL
Qty: 0 | Refills: 0 | Status: COMPLETED | OUTPATIENT
Start: 2017-10-31 | End: 2017-10-31

## 2017-10-31 RX ORDER — METRONIDAZOLE 500 MG
500 TABLET ORAL EVERY 8 HOURS
Qty: 0 | Refills: 0 | Status: DISCONTINUED | OUTPATIENT
Start: 2017-10-31 | End: 2017-11-01

## 2017-10-31 RX ADMIN — Medication 1 DROP(S): at 19:09

## 2017-10-31 RX ADMIN — Medication 100 MILLIEQUIVALENT(S): at 17:54

## 2017-10-31 RX ADMIN — PANTOPRAZOLE SODIUM 40 MILLIGRAM(S): 20 TABLET, DELAYED RELEASE ORAL at 11:35

## 2017-10-31 RX ADMIN — Medication 5 MILLIGRAM(S): at 11:35

## 2017-10-31 RX ADMIN — Medication 100 MILLIEQUIVALENT(S): at 14:15

## 2017-10-31 RX ADMIN — Medication 1 DROP(S): at 05:38

## 2017-10-31 RX ADMIN — Medication 5 MILLIGRAM(S): at 19:09

## 2017-10-31 RX ADMIN — Medication 5 MILLIGRAM(S): at 06:10

## 2017-10-31 RX ADMIN — LATANOPROST 1 DROP(S): 0.05 SOLUTION/ DROPS OPHTHALMIC; TOPICAL at 22:12

## 2017-10-31 RX ADMIN — Medication 5 MILLIGRAM(S): at 23:23

## 2017-10-31 RX ADMIN — Medication 100 MILLIGRAM(S): at 19:08

## 2017-10-31 RX ADMIN — Medication 100 MILLIEQUIVALENT(S): at 11:36

## 2017-10-31 RX ADMIN — Medication 100 MILLIGRAM(S): at 10:01

## 2017-10-31 RX ADMIN — CEFTRIAXONE 100 GRAM(S): 500 INJECTION, POWDER, FOR SOLUTION INTRAMUSCULAR; INTRAVENOUS at 05:38

## 2017-10-31 NOTE — PROGRESS NOTE ADULT - PROBLEM SELECTOR PLAN 1
Etiology unclear. As per report, was not extensive. H&H never affected. May have been from oral trauma or dentition issue. ASA on hold for now- consider restarting. Monitor for rebleed. Trend H&H.

## 2017-10-31 NOTE — PROGRESS NOTE ADULT - PROBLEM SELECTOR PLAN 4
Tachycardic . Continue IV Lopressor. Should be able to transition to PO in the morning as patient passed speech and swallow today and dyspagia-1 diet started today. A-fib with RVR likely the cause of elevated trops- demand ischemia.

## 2017-10-31 NOTE — PROGRESS NOTE ADULT - ASSESSMENT
89 y/o F with a h/o CAD (s/p PCI), a-fib (not on a/c), dementia with UTI, bleed of unknown etiology, diarrhea, failure to thrive. 89 y/o F with a h/o CAD (s/p PCI), a-fib (not on a/c), dementia with UTI, bleed of unknown etiology, CDiff, failure to thrive.

## 2017-10-31 NOTE — PROGRESS NOTE ADULT - PROBLEM SELECTOR PLAN 3
Two more episodes today. CDiff neg. Consider stopping Flagyl. F/u stool culture. CT abdomen/pelvis will be performed in the morning. Two more episodes today. CDiff (+). Will add PO vanco. Isolation precautions. F/u stool culture. CT abdomen/pelvis will be performed in the morning.

## 2017-10-31 NOTE — PROGRESS NOTE ADULT - PROBLEM SELECTOR PLAN 1
Urine culture shows 10, 000 Urine culture shows less than 10, 000 normal radha.  CXR shows possible bibasilar air space disease.  + Diarrhea. Blood culture negative.  Continue with Rocephin, and will add flagyl.  ID consult  Obtain Abdm CT scan to R/O acute process Urine culture shows less than 10, 000 normal radha.  CXR shows possible bibasilar air space disease.  + Diarrhea. Blood culture negative.  Continue with Rocephin, and will add flagyl.  ID consult  Obtain Abdm CT scan to R/O acute process, C-diff, and Stool culture

## 2017-10-31 NOTE — PROGRESS NOTE ADULT - SUBJECTIVE AND OBJECTIVE BOX
Chief Complaint: Weakness, confusion    Interval Events: Failed S&S yesterday    Review of Systems:  General: No fevers, chills, weight loss or gain  Skin: No rashes, color changes  Cardiovascular: No chest pain, orthopnea  Respiratory: No shortness of breath, cough  Gastrointestinal: No nausea, abdominal pain  Genitourinary: No incontinence, pain with urination  Musculoskeletal: No pain, swelling, decreased range of motion  Neurological: No headache, weakness  Psychiatric: No depression, anxiety  Endocrine: No weight loss or gain, increased thirst  All other systems are comprehensively negative.    Physical Exam:  Vital Signs Last 24 Hrs  T(C): 36.1 (31 Oct 2017 08:55), Max: 36.8 (30 Oct 2017 12:04)  T(F): 97 (31 Oct 2017 08:55), Max: 98.2 (30 Oct 2017 12:04)  HR: 105 (31 Oct 2017 06:00) (91 - 106)  BP: 144/96 (31 Oct 2017 06:00) (123/88 - 152/99)  BP(mean): 112 (31 Oct 2017 06:00) (88 - 112)  RR: 19 (31 Oct 2017 06:00) (14 - 31)  SpO2: 98% (31 Oct 2017 06:00) (96% - 100%)  General: NAD  HEENT: MMM  Neck: No JVD, no carotid bruit  Lungs: CTAB  CV: RRR, nl S1/S2, 2/6 systolic murmur  Abdomen: S/NT/ND, +BS  Extremities: 1+ LE edema, no cyanosis  Neuro: AAOx3, non-focal  Skin: No rash    Labs:                        12.7   19.1  )-----------( 212      ( 30 Oct 2017 07:18 )             37.3     10-30    142  |  111<H>  |  34<H>  ----------------------------<  152<H>  4.1   |  22  |  1.12    Ca    8.3<L>      30 Oct 2017 07:18    TPro  5.9<L>  /  Alb  2.7<L>  /  TBili  0.9  /  DBili  x   /  AST  32  /  ALT  31  /  AlkPhos  68  10-29    CARDIAC MARKERS ( 30 Oct 2017 07:18 )  .563 ng/mL / x     / x     / x     / x      CARDIAC MARKERS ( 29 Oct 2017 22:11 )  .159 ng/mL / x     / x     / x     / x      CARDIAC MARKERS ( 29 Oct 2017 11:24 )  .080 ng/mL / x     / x     / x     / 3.0 ng/mL      PT/INR - ( 29 Oct 2017 11:24 )   PT: 13.4 sec;   INR: 1.22 ratio         PTT - ( 29 Oct 2017 11:24 )  PTT:26.8 sec    Telemetry: Sinus rhythm, PACs, frequent brief runs of AF Controlled with current regime

## 2017-10-31 NOTE — PHYSICAL THERAPY INITIAL EVALUATION ADULT - ADDITIONAL COMMENTS
Pt poor historian. History obtained through [facility] maria. Pt lives in apartment with 24/7 HHA. Pt was negotiating stairs PTA.

## 2017-10-31 NOTE — PROGRESS NOTE ADULT - PROBLEM SELECTOR PLAN 2
Probably due to dehydration, and recent diarrhea.  Resolving with IVF.  Monitor renal function Resolved with hydration. Monitor Renal function

## 2017-10-31 NOTE — PROGRESS NOTE ADULT - PROBLEM SELECTOR PLAN 4
NPO until further evaluation by Speech therapy.  PT consult NPO as per speech therapy evaluation.   family does not want feeding tube.  DNR/DNI.  Continue with PT.    Palliative care consult

## 2017-10-31 NOTE — PHYSICAL THERAPY INITIAL EVALUATION ADULT - PERTINENT HX OF CURRENT PROBLEM, REHAB EVAL
Patient is 89 yo female with hx of CAD s/P stent, and dementia presenting with failure to thrive and generalized weakness.  afebrile.  Patient appears to be confused, and unable to obtain complete hx or ROS.  HX was mainly from son, and daughter.

## 2017-10-31 NOTE — PROGRESS NOTE ADULT - SUBJECTIVE AND OBJECTIVE BOX
Patient is a 89y old  Female who presents with a chief complaint of failure to thrives (29 Oct 2017 14:01)      BRIEF HOSPITAL COURSE: ***    Events last 24 hours: ***    PAST MEDICAL & SURGICAL HISTORY:  Dementia  Stented Coronary Artery  No significant past surgical history      Review of Systems:  CONSTITUTIONAL: No fever, chills, or fatigue  EYES: No eye pain, visual disturbances, or discharge  ENMT:  No difficulty hearing, tinnitus, vertigo; No sinus or throat pain  NECK: No pain or stiffness  RESPIRATORY: No cough, wheezing, chills or hemoptysis; No shortness of breath  CARDIOVASCULAR: No chest pain, palpitations, dizziness, or leg swelling  GASTROINTESTINAL: No abdominal or epigastric pain. No nausea, vomiting, or hematemesis; No diarrhea or constipation. No melena or hematochezia.  GENITOURINARY: No dysuria, frequency, hematuria, or incontinence  NEUROLOGICAL: No headaches, memory loss, loss of strength, numbness, or tremors  SKIN: No itching, burning, rashes, or lesions   MUSCULOSKELETAL: No joint pain or swelling; No muscle, back, or extremity pain  PSYCHIATRIC: No depression, anxiety, mood swings, or difficulty sleeping      Medications:  cefTRIAXone   IVPB 1 Gram(s) IV Intermittent every 24 hours  metroNIDAZOLE  IVPB      metroNIDAZOLE  IVPB 500 milliGRAM(s) IV Intermittent every 8 hours    metoprolol    tartrate Injectable 5 milliGRAM(s) IV Push every 6 hours      ondansetron Injectable 4 milliGRAM(s) IV Push every 6 hours PRN        pantoprazole  Injectable 40 milliGRAM(s) IV Push daily      atorvastatin 40 milliGRAM(s) Oral at bedtime        latanoprost 0.005% Ophthalmic Solution 1 Drop(s) Both EYES at bedtime  timolol 0.25% Solution 1 Drop(s) Both EYES two times a day            ICU Vital Signs Last 24 Hrs  T(C): 37.1 (31 Oct 2017 16:02), Max: 37.1 (31 Oct 2017 16:02)  T(F): 98.8 (31 Oct 2017 16:02), Max: 98.8 (31 Oct 2017 16:02)  HR: 118 (31 Oct 2017 18:00) (94 - 118)  BP: 136/91 (31 Oct 2017 18:00) (120/81 - 148/99)  BP(mean): 105 (31 Oct 2017 18:00) (88 - 113)  ABP: --  ABP(mean): --  RR: 24 (31 Oct 2017 18:00) (14 - 30)  SpO2: 98% (31 Oct 2017 18:00) (97% - 100%)          I&O's Detail    30 Oct 2017 07:01  -  31 Oct 2017 07:00  --------------------------------------------------------  IN:    dextrose 5% + sodium chloride 0.9%: 400 mL    IV PiggyBack: 50 mL    sodium chloride 0.9%: 760 mL  Total IN: 1210 mL    OUT:    Voided: 1 mL  Total OUT: 1 mL    Total NET: 1209 mL      31 Oct 2017 07:01  -  31 Oct 2017 20:23  --------------------------------------------------------  IN:    IV PiggyBack: 300 mL    sodium chloride 0.9%: 360 mL  Total IN: 660 mL    OUT:  Total OUT: 0 mL    Total NET: 660 mL            LABS:                        13.4   20.1  )-----------( 184      ( 31 Oct 2017 06:14 )             37.9     10-31    143  |  113<H>  |  25<H>  ----------------------------<  116<H>  3.4<L>   |  18<L>  |  0.74    Ca    8.1<L>      31 Oct 2017 06:14        CARDIAC MARKERS ( 31 Oct 2017 06:14 )  .432 ng/mL / x     / x     / x     / x      CARDIAC MARKERS ( 30 Oct 2017 17:06 )  .656 ng/mL / x     / x     / x     / x      CARDIAC MARKERS ( 30 Oct 2017 07:18 )  .563 ng/mL / x     / x     / x     / x      CARDIAC MARKERS ( 29 Oct 2017 22:11 )  .159 ng/mL / x     / x     / x     / x          CAPILLARY BLOOD GLUCOSE            CULTURES:  Culture Results:   No growth to date. (10-29-17 @ 21:40)  Culture Results:   No growth to date. (10-29-17 @ 21:40)  Culture Results:   <10,000 CFU/ml Normal Urogenital radha present (10-29-17 @ 21:20)      Physical Examination:    General: No acute distress.  Alert, oriented, interactive, nonfocal    HEENT: Pupils equal, reactive to light.  Symmetric.    PULM: Clear to auscultation bilaterally, no significant sputum production    CVS: Regular rate and rhythm, no murmurs, rubs, or gallops    ABD: Soft, nondistended, nontender, normoactive bowel sounds, no masses    EXT: No edema, nontender    SKIN: Warm and well perfused, no rashes noted.    NEURO: A&Ox3, strength 5/5 all extremities, cranial nerves grossly intact, no focal deficits    RADIOLOGY: ***    CRITICAL CARE TIME SPENT: ***  Evaluating/treating patient, reviewing data/labs/imaging, discussing case with multidisciplinary team, discussing plan/goals of care with patient/family. Non-inclusive of procedure time. Patient is a 89y old  Female who presents with a chief complaint of failure to thrives (29 Oct 2017 14:01)      BRIEF HOSPITAL COURSE: 89 y/o F with a h/o CAD (s/p PCI), a-fib (not on a/c), dementia admitted on 10/29 with generalized weakness and failure to thrive. UA (+). Transferred to [facility] when noted to have blood in oral cavity. Course complicated by multiple episodes of diarrhea.    Events last 24 hours: Patient awake, but confused. Without complaints, appears comfortable. No bleeding noted, no obvious source seen in mouth. As per report, two more episodes of diarrhea today. Tachycardic. BP stable. Afebrile.    PAST MEDICAL & SURGICAL HISTORY:  Dementia  Stented Coronary Artery  No significant past surgical history      Review of Systems: Unable to obtain secondary to mental status, however, denying pain or SOB.        Medications:  cefTRIAXone   IVPB 1 Gram(s) IV Intermittent every 24 hours  metroNIDAZOLE  IVPB      metroNIDAZOLE  IVPB 500 milliGRAM(s) IV Intermittent every 8 hours  metoprolol    tartrate Injectable 5 milliGRAM(s) IV Push every 6 hours  ondansetron Injectable 4 milliGRAM(s) IV Push every 6 hours PRN  pantoprazole  Injectable 40 milliGRAM(s) IV Push daily  atorvastatin 40 milliGRAM(s) Oral at bedtime  latanoprost 0.005% Ophthalmic Solution 1 Drop(s) Both EYES at bedtime  timolol 0.25% Solution 1 Drop(s) Both EYES two times a day            ICU Vital Signs Last 24 Hrs  T(C): 37.1 (31 Oct 2017 16:02), Max: 37.1 (31 Oct 2017 16:02)  T(F): 98.8 (31 Oct 2017 16:02), Max: 98.8 (31 Oct 2017 16:02)  HR: 118 (31 Oct 2017 18:00) (94 - 118)  BP: 136/91 (31 Oct 2017 18:00) (120/81 - 148/99)  BP(mean): 105 (31 Oct 2017 18:00) (88 - 113)  ABP: --  ABP(mean): --  RR: 24 (31 Oct 2017 18:00) (14 - 30)  SpO2: 98% (31 Oct 2017 18:00) (97% - 100%)          I&O's Detail    30 Oct 2017 07:01  -  31 Oct 2017 07:00  --------------------------------------------------------  IN:    dextrose 5% + sodium chloride 0.9%: 400 mL    IV PiggyBack: 50 mL    sodium chloride 0.9%: 760 mL  Total IN: 1210 mL    OUT:    Voided: 1 mL  Total OUT: 1 mL    Total NET: 1209 mL      31 Oct 2017 07:01  -  31 Oct 2017 20:23  --------------------------------------------------------  IN:    IV PiggyBack: 300 mL    sodium chloride 0.9%: 360 mL  Total IN: 660 mL    OUT:  Total OUT: 0 mL    Total NET: 660 mL            LABS:                        13.4   20.1  )-----------( 184      ( 31 Oct 2017 06:14 )             37.9     10-31    143  |  113<H>  |  25<H>  ----------------------------<  116<H>  3.4<L>   |  18<L>  |  0.74    Ca    8.1<L>      31 Oct 2017 06:14        CARDIAC MARKERS ( 31 Oct 2017 06:14 )  .432 ng/mL / x     / x     / x     / x      CARDIAC MARKERS ( 30 Oct 2017 17:06 )  .656 ng/mL / x     / x     / x     / x      CARDIAC MARKERS ( 30 Oct 2017 07:18 )  .563 ng/mL / x     / x     / x     / x      CARDIAC MARKERS ( 29 Oct 2017 22:11 )  .159 ng/mL / x     / x     / x     / x          CAPILLARY BLOOD GLUCOSE            CULTURES:  Culture Results:   No growth to date. (10-29-17 @ 21:40)  Culture Results:   No growth to date. (10-29-17 @ 21:40)  Culture Results:   <10,000 CFU/ml Normal Urogenital radha present (10-29-17 @ 21:20)      Physical Examination:    General: No acute distress.  Confused    HEENT: Pupils equal, reactive to light.  Symmetric.    PULM: Clear to auscultation bilaterally, no significant sputum production    CVS: tachycardic, irregularly irregular rhythm, no murmurs, rubs, or gallops    ABD: Soft, nondistended, nontender, normoactive bowel sounds, no masses    EXT: b/l LE edema (1+ pitting), nontender    SKIN: Warm and well perfused, no rashes noted.    NEURO: A&Ox0, not responding appropriately to most questions, moves all extremities spontaneously    RADIOLOGY:     < from: Xray Chest 1 View AP -PORTABLE-Routine (10.31.17 @ 09:31) >  Cardiac monitor leads overlie the chest. Bibasilar airspace disease,   vascular congestion versus pneumonic processes. The costophrenic angles   are blunted. Heart size within normal limits. Mitral annulus   calcifications present. No acute osseous abnormalities. Aorta shows   atherosclerotic changes.      < from: US Transthoracic Echocardiogram w/Doppler Complete (10.30.17 @ 09:55) >  Measurements:  Aortic root size 3.4 cm, left it is at 3.7 cm, left   ventricular end-diastolic diameter 4.2 cm, left ventricular end-systolic   diameter 2.9 cm, septal wall thickness 1.1 cm, posterior wallthickness   1.1 cm, aortic velocity 4.5 m/s, mitral E velocity 1.3 m/s, ejection   fraction 66%.    Mitral Valve: There is mild mitral annular consultation, thickened mitral   valve with the moderate to severe mitral regurgitation  Aortic Valve: Calcified aortic valve with the severely restricted aortic   valve cusp excursion with peak gradient 80 mmHg, mean gradient 51 mmHg   with measured aortic valve area 0.6 sq cm consistent with critical aortic   stenosis. Mild to moderate aortic regurgitation noted  Left Atrium: Enlarged  Left Ventricle: Borderline left ventricular hypertrophy, Normal size with   normal overall left ventricular systolic function   Pericardium: No pericardial effusion  Tricuspid Valve: Appears normal with moderate tricuspid regurgitation   with moderately elevated right ventricular systolic pressure 55 mmHg  Pulmonic Valve: Not well-visualized with the mild pulmonic regurgitation  Right Ventricle: Grossly appears normal size with normal right   ventricular systolic function  Right Atrium: Mildly enlarged    SUMMARY:  1. borderline left ventricular hypertrophy with normal left ventricular   systolic function with mild diastolic dysfunction  2. mitral annular calcification, moderate to severe mitral regurgitation  3.  severe aortic stenosis, mild to moderate aortic regurgitation.   4. Moderate tricuspid regurgitation with moderate pulmonary hypertension    < from: CT Head No Cont (10.29.17 @ 11:30) >  Impression:    Chronic microvascular ischemic disease, no acute intracranial hemorrhage   noted.        CRITICAL CARE TIME SPENT: 37 mins  Evaluating/treating patient, reviewing data/labs/imaging, discussing case with multidisciplinary team, discussing plan/goals of care with patient/family. Non-inclusive of procedure time.

## 2017-10-31 NOTE — PROGRESS NOTE ADULT - ASSESSMENT
The patient is a 89 year old female with a history of CAD s/p PCI, chronic AF, dementia who presents with weakness and near syncope, found to have UTI, GRACIELA, rapid AF.    Plan:  - Remains on metoprolol IV since cannot take PO.  - Continue IVF as needed. Clinically hypo/euvolemic.  - Echocardiogram with normal LV systolic function, severe AS.  - Not a candidate for valve intervention  - Not a candidate for long-term AC given advanced dementia  - Off of aspirin due to possible bleed  - Hold ramipril given GRACIELA  - Await S&S

## 2017-10-31 NOTE — PROGRESS NOTE ADULT - PROBLEM SELECTOR PLAN 2
Continue antibiotic therapy with ceftriaxone. Blood and urine cultures neg for growth so far. Continue to follow. Likely contributing to degree of confusion.

## 2017-10-31 NOTE — PROGRESS NOTE ADULT - PROBLEM SELECTOR PLAN 3
Continue with BB, and Monitor HR.  Currently in NSR.  Poor candidate for anticoagulation due to Advanced age, Fall risk, and Recent bleed.  .  Echo, and  TSH level are pending.  Cardiology to follow up    -abnormal trop- probably due to demand mismatch ischemia in the setting of atrial fib with RVR, and ARF.  TTE pending.  EKG shows no ischemic changes.  Monitor trop level Continue with BB, and Monitor HR.   Poor candidate for anticoagulation due to Advanced age, Fall risk, and Recent bleed.  .  Echo shows borderline left ventricular hypertrophy with normal left ventricular with severe AS.  TSH level WNL.  Cardiology to follow up    -abnormal trop- probably due to demand mismatch ischemia in the setting of atrial fib with RVR, and ARF.  TTE pending.  EKG shows no ischemic changes.  Monitor trop level

## 2017-10-31 NOTE — PROGRESS NOTE ADULT - ATTENDING COMMENTS
Plan of care was discussed with son/daughter/HCP in great details, All questions were answered to their satisfaction.  Seems to understand, and in agreement  Code status DNR/DNI as per family's wishes Plan of care was discussed with son HCP in great details, All questions were answered to their satisfaction.  Seems to understand, and in agreement Plan of care was discussed with son HCP in great details, All questions were answered to their satisfaction.  Seems to understand, and in agreement  DVT proph compression stocking in the setting of recent mouth bleeding which resolved now

## 2017-10-31 NOTE — PROGRESS NOTE ADULT - SUBJECTIVE AND OBJECTIVE BOX
CC.  Diarrhea    Patient appears to be pleasantly confused.  + diarrhea noticed overnight.  Now complaining of abdominal pain.  More awake, Poor historian.  unable to obtain ROS, or complete HPI      Vital Signs Last 24 Hrs  T(C): 36.1 (31 Oct 2017 08:55), Max: 36.8 (30 Oct 2017 12:04)  T(F): 97 (31 Oct 2017 08:55), Max: 98.2 (30 Oct 2017 12:04)  HR: 105 (31 Oct 2017 06:00) (82 - 106)  BP: 144/96 (31 Oct 2017 06:00) (119/91 - 152/99)  BP(mean): 112 (31 Oct 2017 06:00) (88 - 112)  RR: 19 (31 Oct 2017 06:00) (14 - 31)  SpO2: 98% (31 Oct 2017 06:00) (96% - 100%)      PHYSICAL EXAM-  GENERAL: chronic ill appearing female, frails  HEAD:  Atraumatic, Normocephalic  NECK: Supple, No JVD, Normal thyroid  NERVOUS SYSTEM:  Alert but pleasantly confused.  Moving all extremities  CHEST/LUNG: Clear to percussion bilaterally; No rales, rhonchi, wheezing, or rubs  HEART: Regular rate and rhythm; No murmurs, rubs, or gallops  ABDOMEN: Hyperactive BS.  Non-tender.  No rebound, or guarding  SKIN: No rashes or lesions                              13.4   20.1  )-----------( 184      ( 31 Oct 2017 06:14 )             37.9     10-31    143  |  113<H>  |  25<H>  ----------------------------<  116<H>  3.4<L>   |  18<L>  |  0.74    Ca    8.1<L>      31 Oct 2017 06:14    TPro  5.9<L>  /  Alb  2.7<L>  /  TBili  0.9  /  DBili  x   /  AST  32  /  ALT  31  /  AlkPhos  68  10-29    CARDIAC MARKERS ( 31 Oct 2017 06:14 )  .432 ng/mL / x     / x     / x     / x      CARDIAC MARKERS ( 30 Oct 2017 17:06 )  .656 ng/mL / x     / x     / x     / x      CARDIAC MARKERS ( 30 Oct 2017 07:18 )  .563 ng/mL / x     / x     / x     / x      CARDIAC MARKERS ( 29 Oct 2017 22:11 )  .159 ng/mL / x     / x     / x     / x      CARDIAC MARKERS ( 29 Oct 2017 11:24 )  .080 ng/mL / x     / x     / x     / 3.0 ng/mL        Urinalysis Basic - ( 29 Oct 2017 11:52 )    Color: Yellow / Appearance: Slightly Turbid / S.015 / pH: x  Gluc: x / Ketone: Trace  / Bili: Moderate / Urobili: 4 mg/dL   Blood: x / Protein: 30 mg/dL / Nitrite: Negative   Leuk Esterase: Small / RBC: 25-50 /HPF / WBC 3-5   Sq Epi: x / Non Sq Epi: Moderate / Bacteria: Moderate      PT/INR - ( 29 Oct 2017 11:24 )   PT: 13.4 sec;   INR: 1.22 ratio         PTT - ( 29 Oct 2017 11:24 )  PTT:26.8 sec

## 2017-11-01 DIAGNOSIS — R54 AGE-RELATED PHYSICAL DEBILITY: ICD-10-CM

## 2017-11-01 DIAGNOSIS — A04.72 ENTEROCOLITIS DUE TO CLOSTRIDIUM DIFFICILE, NOT SPECIFIED AS RECURRENT: ICD-10-CM

## 2017-11-01 LAB
ALBUMIN SERPL ELPH-MCNC: 2.2 G/DL — LOW (ref 3.3–5)
ALP SERPL-CCNC: 65 U/L — SIGNIFICANT CHANGE UP (ref 30–120)
ALT FLD-CCNC: 24 U/L DA — SIGNIFICANT CHANGE UP (ref 10–60)
ANION GAP SERPL CALC-SCNC: 13 MMOL/L — SIGNIFICANT CHANGE UP (ref 5–17)
AST SERPL-CCNC: 26 U/L — SIGNIFICANT CHANGE UP (ref 10–40)
BILIRUB SERPL-MCNC: 0.8 MG/DL — SIGNIFICANT CHANGE UP (ref 0.2–1.2)
BUN SERPL-MCNC: 25 MG/DL — HIGH (ref 7–23)
C DIFF BY PCR RESULT: DETECTED
C DIFF TOX GENS STL QL NAA+PROBE: SIGNIFICANT CHANGE UP
CALCIUM SERPL-MCNC: 8.2 MG/DL — LOW (ref 8.4–10.5)
CHLORIDE SERPL-SCNC: 115 MMOL/L — HIGH (ref 96–108)
CO2 SERPL-SCNC: 20 MMOL/L — LOW (ref 22–31)
CREAT SERPL-MCNC: 1.03 MG/DL — SIGNIFICANT CHANGE UP (ref 0.5–1.3)
GLUCOSE SERPL-MCNC: 144 MG/DL — HIGH (ref 70–99)
HCT VFR BLD CALC: 39.8 % — SIGNIFICANT CHANGE UP (ref 34.5–45)
HGB BLD-MCNC: 13.2 G/DL — SIGNIFICANT CHANGE UP (ref 11.5–15.5)
LIDOCAIN IGE QN: 347 U/L — SIGNIFICANT CHANGE UP (ref 73–393)
MAGNESIUM SERPL-MCNC: 1.6 MG/DL — SIGNIFICANT CHANGE UP (ref 1.6–2.6)
MCHC RBC-ENTMCNC: 32.3 PG — SIGNIFICANT CHANGE UP (ref 27–34)
MCHC RBC-ENTMCNC: 33.2 GM/DL — SIGNIFICANT CHANGE UP (ref 32–36)
MCV RBC AUTO: 97.4 FL — SIGNIFICANT CHANGE UP (ref 80–100)
PHOSPHATE SERPL-MCNC: 1.9 MG/DL — LOW (ref 2.5–4.5)
PLATELET # BLD AUTO: 223 K/UL — SIGNIFICANT CHANGE UP (ref 150–400)
POTASSIUM SERPL-MCNC: 3.2 MMOL/L — LOW (ref 3.5–5.3)
POTASSIUM SERPL-SCNC: 3.2 MMOL/L — LOW (ref 3.5–5.3)
PROT SERPL-MCNC: 5.3 G/DL — LOW (ref 6–8.3)
RBC # BLD: 4.09 M/UL — SIGNIFICANT CHANGE UP (ref 3.8–5.2)
RBC # FLD: 13.3 % — SIGNIFICANT CHANGE UP (ref 10.3–14.5)
SODIUM SERPL-SCNC: 148 MMOL/L — HIGH (ref 135–145)
WBC # BLD: 21.5 K/UL — HIGH (ref 3.8–10.5)
WBC # FLD AUTO: 21.5 K/UL — HIGH (ref 3.8–10.5)

## 2017-11-01 PROCEDURE — 74176 CT ABD & PELVIS W/O CONTRAST: CPT | Mod: 26

## 2017-11-01 PROCEDURE — 99233 SBSQ HOSP IP/OBS HIGH 50: CPT

## 2017-11-01 RX ORDER — METOPROLOL TARTRATE 50 MG
5 TABLET ORAL EVERY 6 HOURS
Qty: 0 | Refills: 0 | Status: DISCONTINUED | OUTPATIENT
Start: 2017-11-01 | End: 2017-11-01

## 2017-11-01 RX ORDER — LACTOBACILLUS ACIDOPHILUS 100MM CELL
1 CAPSULE ORAL
Qty: 0 | Refills: 0 | Status: DISCONTINUED | OUTPATIENT
Start: 2017-11-01 | End: 2017-11-03

## 2017-11-01 RX ORDER — RISPERIDONE 4 MG/1
0.25 TABLET ORAL AT BEDTIME
Qty: 0 | Refills: 0 | Status: DISCONTINUED | OUTPATIENT
Start: 2017-11-01 | End: 2017-11-02

## 2017-11-01 RX ORDER — VANCOMYCIN HCL 1 G
125 VIAL (EA) INTRAVENOUS EVERY 6 HOURS
Qty: 0 | Refills: 0 | Status: DISCONTINUED | OUTPATIENT
Start: 2017-11-01 | End: 2017-11-03

## 2017-11-01 RX ORDER — SODIUM,POTASSIUM PHOSPHATES 278-250MG
1 POWDER IN PACKET (EA) ORAL
Qty: 0 | Refills: 0 | Status: DISCONTINUED | OUTPATIENT
Start: 2017-11-01 | End: 2017-11-03

## 2017-11-01 RX ORDER — METOPROLOL TARTRATE 50 MG
25 TABLET ORAL
Qty: 0 | Refills: 0 | Status: DISCONTINUED | OUTPATIENT
Start: 2017-11-01 | End: 2017-11-01

## 2017-11-01 RX ORDER — POTASSIUM CHLORIDE 20 MEQ
10 PACKET (EA) ORAL
Qty: 0 | Refills: 0 | Status: COMPLETED | OUTPATIENT
Start: 2017-11-01 | End: 2017-11-01

## 2017-11-01 RX ORDER — ACETAMINOPHEN 500 MG
650 TABLET ORAL EVERY 6 HOURS
Qty: 0 | Refills: 0 | Status: DISCONTINUED | OUTPATIENT
Start: 2017-11-01 | End: 2017-11-03

## 2017-11-01 RX ORDER — MAGNESIUM SULFATE 500 MG/ML
2 VIAL (ML) INJECTION ONCE
Qty: 0 | Refills: 0 | Status: COMPLETED | OUTPATIENT
Start: 2017-11-01 | End: 2017-11-01

## 2017-11-01 RX ORDER — METOPROLOL TARTRATE 50 MG
25 TABLET ORAL
Qty: 0 | Refills: 0 | Status: DISCONTINUED | OUTPATIENT
Start: 2017-11-01 | End: 2017-11-02

## 2017-11-01 RX ADMIN — Medication 1 TABLET(S): at 17:22

## 2017-11-01 RX ADMIN — ATORVASTATIN CALCIUM 40 MILLIGRAM(S): 80 TABLET, FILM COATED ORAL at 21:54

## 2017-11-01 RX ADMIN — Medication 100 MILLIEQUIVALENT(S): at 13:45

## 2017-11-01 RX ADMIN — RISPERIDONE 0.25 MILLIGRAM(S): 4 TABLET ORAL at 21:54

## 2017-11-01 RX ADMIN — CEFTRIAXONE 100 GRAM(S): 500 INJECTION, POWDER, FOR SOLUTION INTRAMUSCULAR; INTRAVENOUS at 06:01

## 2017-11-01 RX ADMIN — Medication 1 TABLET(S): at 12:05

## 2017-11-01 RX ADMIN — Medication 125 MILLIGRAM(S): at 17:21

## 2017-11-01 RX ADMIN — Medication 1 TABLET(S): at 17:21

## 2017-11-01 RX ADMIN — Medication 100 MILLIEQUIVALENT(S): at 12:04

## 2017-11-01 RX ADMIN — Medication 5 MILLIGRAM(S): at 06:01

## 2017-11-01 RX ADMIN — Medication 50 GRAM(S): at 10:22

## 2017-11-01 RX ADMIN — Medication 650 MILLIGRAM(S): at 17:22

## 2017-11-01 RX ADMIN — Medication 100 MILLIEQUIVALENT(S): at 14:14

## 2017-11-01 RX ADMIN — Medication 1 TABLET(S): at 21:54

## 2017-11-01 RX ADMIN — Medication 125 MILLIGRAM(S): at 12:06

## 2017-11-01 RX ADMIN — Medication 1 DROP(S): at 17:21

## 2017-11-01 RX ADMIN — Medication 1 DROP(S): at 06:01

## 2017-11-01 RX ADMIN — Medication 25 MILLIGRAM(S): at 17:22

## 2017-11-01 RX ADMIN — Medication 100 MILLIGRAM(S): at 01:13

## 2017-11-01 RX ADMIN — LATANOPROST 1 DROP(S): 0.05 SOLUTION/ DROPS OPHTHALMIC; TOPICAL at 21:54

## 2017-11-01 NOTE — PROGRESS NOTE ADULT - ATTENDING COMMENTS
Plan of care was discussed with son HCP in great details, All questions were answered to their satisfaction.  Seems to understand, and in agreement  DVT proph compression stocking in the setting of recent mouth bleeding which resolved now

## 2017-11-01 NOTE — PROGRESS NOTE ADULT - PROBLEM SELECTOR PLAN 1
Probably due to C-diff colitis.  Will start PO Vanco as per ID.  Blood culture negative.  Obtain Abdm CT scan to R/O acute process.

## 2017-11-01 NOTE — PROGRESS NOTE ADULT - ASSESSMENT
The patient is a 89 year old female with a history of CAD s/p PCI, chronic AF, dementia who presents with weakness and near syncope, found to have UTI, GRACIELA, rapid AF.    Plan:  - Will continue metoprolol IV for now. As per nurse, not adequately taking pills yet.  - Continue IVF if concern for continued diarrhea  - Echocardiogram with normal LV systolic function, severe AS.  - Not a candidate for valve intervention  - Not a candidate for long-term AC given advanced dementia  - Off of aspirin due to possible bleed  - Hold ramipril given GRACIELA  - On vancomycin PO for C. diff

## 2017-11-01 NOTE — PROGRESS NOTE ADULT - SUBJECTIVE AND OBJECTIVE BOX
Chief Complaint: Weakness, confusion    Interval Events: Tested positive for C. diff    Review of Systems:  General: No fevers, chills, weight loss or gain  Skin: No rashes, color changes  Cardiovascular: No chest pain, orthopnea  Respiratory: No shortness of breath, cough  Gastrointestinal: No nausea, abdominal pain  Genitourinary: No incontinence, pain with urination  Musculoskeletal: No pain, swelling, decreased range of motion  Neurological: No headache, weakness  Psychiatric: No depression, anxiety  Endocrine: No weight loss or gain, increased thirst  All other systems are comprehensively negative.    Physical Exam:  Vital Signs Last 24 Hrs  T(C): 36.3 (01 Nov 2017 08:23), Max: 37.1 (31 Oct 2017 16:02)  T(F): 97.4 (01 Nov 2017 08:23), Max: 98.8 (31 Oct 2017 16:02)  HR: 88 (01 Nov 2017 06:00) (88 - 132)  BP: 120/60 (01 Nov 2017 06:00) (120/60 - 158/106)  BP(mean): 75 (01 Nov 2017 06:00) (75 - 117)  RR: 23 (01 Nov 2017 06:00) (18 - 26)  SpO2: 100% (01 Nov 2017 06:00) (98% - 100%)  General: NAD  HEENT: MMM  Neck: No JVD, no carotid bruit  Lungs: CTAB  CV: RRR, nl S1/S2, 2/6 systolic murmur  Abdomen: S/NT/ND, +BS  Extremities: 1+ LE edema, no cyanosis  Neuro: AAOx3, non-focal  Skin: No rash    Labs:             11-01    148<H>  |  115<H>  |  25<H>  ----------------------------<  144<H>  3.2<L>   |  20<L>  |  1.03    Ca    8.2<L>      01 Nov 2017 06:19  Phos  1.9     11-01  Mg     1.6     11-01    TPro  5.3<L>  /  Alb  2.2<L>  /  TBili  0.8  /  DBili  x   /  AST  26  /  ALT  24  /  AlkPhos  65  11-01                          13.2   21.5  )-----------( 223      ( 01 Nov 2017 06:19 )             39.8       Telemetry: Sinus rhythm, PACs, frequent brief runs of AF

## 2017-11-01 NOTE — PROGRESS NOTE ADULT - PROBLEM SELECTOR PLAN 4
Continue with Puree diet with necator thicken liquid as per Speech therapy.    Family does not want feeding tube.  DNR/DNI.  Continue with PT.    Palliative care consult

## 2017-11-01 NOTE — PROGRESS NOTE ADULT - PROBLEM SELECTOR PLAN 3
Continue with BB, and Monitor HR.   Poor candidate for anticoagulation due to Advanced age, Fall risk, and Recent bleed.  .  Echo shows borderline left ventricular hypertrophy with normal left ventricular with severe AS.  TSH level WNL.  Cardiology to follow up    -abnormal trop- probably due to demand mismatch ischemia in the setting of atrial fib with RVR, and ARF.  TTE pending.  EKG shows no ischemic changes.  Monitor trop level

## 2017-11-01 NOTE — PROGRESS NOTE ADULT - SUBJECTIVE AND OBJECTIVE BOX
CC.  Diarrhea    HPI.  Patient reports that she feels better.  Diarrhea improving.  Tolerating liquid ok.    Unable to obtain complete hx due to underline dementia    Vital Signs Last 24 Hrs  T(C): 36.3 (01 Nov 2017 08:23), Max: 37.1 (31 Oct 2017 16:02)  T(F): 97.4 (01 Nov 2017 08:23), Max: 98.8 (31 Oct 2017 16:02)  HR: 88 (01 Nov 2017 06:00) (88 - 132)  BP: 120/60 (01 Nov 2017 06:00) (120/60 - 158/106)  BP(mean): 75 (01 Nov 2017 06:00) (75 - 117)  RR: 23 (01 Nov 2017 06:00) (18 - 26)  SpO2: 100% (01 Nov 2017 06:00) (98% - 100%)      PHYSICAL EXAM-  GENERAL: Chronic ill appearing  HEAD:  Atraumatic, Normocephalic  NECK: Supple, No JVD, Normal thyroid  NERVOUS SYSTEM:  Disorientated moving all extremities  CHEST/LUNG: Clear to percussion bilaterally; No rales, rhonchi, wheezing, or rubs  HEART: Regular rate and rhythm; No murmurs, rubs, or gallops  ABDOMEN: Soft, Nontender, Nondistended; Bowel sounds present  SKIN: No rashes or lesions                              13.2   21.5  )-----------( 223      ( 01 Nov 2017 06:19 )             39.8     11-01    148<H>  |  115<H>  |  25<H>  ----------------------------<  144<H>  3.2<L>   |  20<L>  |  1.03    Ca    8.2<L>      01 Nov 2017 06:19  Phos  1.9     11-01  Mg     1.6     11-01    TPro  5.3<L>  /  Alb  2.2<L>  /  TBili  0.8  /  DBili  x   /  AST  26  /  ALT  24  /  AlkPhos  65  11-01    CARDIAC MARKERS ( 31 Oct 2017 06:14 )  .432 ng/mL / x     / x     / x     / x      CARDIAC MARKERS ( 30 Oct 2017 17:06 )  .656 ng/mL / x     / x     / x     / x

## 2017-11-01 NOTE — CONSULT NOTE ADULT - PROBLEM SELECTOR RECOMMENDATION 9
pt is DNR DNI  spoke with son, he is considering dc home with 24 hr HHA  pt has dementia, delirium, and advanced age  prognosis poor  son is aware  pt will need resources in place if we are considering home discharge  social work to follow up  pt's son is not enthusiastic about SNF/ [facility]

## 2017-11-02 ENCOUNTER — TRANSCRIPTION ENCOUNTER (OUTPATIENT)
Age: 82
End: 2017-11-02

## 2017-11-02 DIAGNOSIS — G30.1 ALZHEIMER'S DISEASE WITH LATE ONSET: ICD-10-CM

## 2017-11-02 DIAGNOSIS — F05 DELIRIUM DUE TO KNOWN PHYSIOLOGICAL CONDITION: ICD-10-CM

## 2017-11-02 LAB
ANION GAP SERPL CALC-SCNC: 8 MMOL/L — SIGNIFICANT CHANGE UP (ref 5–17)
BASOPHILS # BLD AUTO: 0 K/UL — SIGNIFICANT CHANGE UP (ref 0–0.2)
BASOPHILS NFR BLD AUTO: 0.3 % — SIGNIFICANT CHANGE UP (ref 0–2)
BUN SERPL-MCNC: 21 MG/DL — SIGNIFICANT CHANGE UP (ref 7–23)
CALCIUM SERPL-MCNC: 8 MG/DL — LOW (ref 8.4–10.5)
CHLORIDE SERPL-SCNC: 117 MMOL/L — HIGH (ref 96–108)
CO2 SERPL-SCNC: 24 MMOL/L — SIGNIFICANT CHANGE UP (ref 22–31)
CREAT SERPL-MCNC: 0.91 MG/DL — SIGNIFICANT CHANGE UP (ref 0.5–1.3)
EOSINOPHIL # BLD AUTO: 0.1 K/UL — SIGNIFICANT CHANGE UP (ref 0–0.5)
EOSINOPHIL NFR BLD AUTO: 0.7 % — SIGNIFICANT CHANGE UP (ref 0–6)
GLUCOSE SERPL-MCNC: 116 MG/DL — HIGH (ref 70–99)
HCT VFR BLD CALC: 37.8 % — SIGNIFICANT CHANGE UP (ref 34.5–45)
HGB BLD-MCNC: 12.6 G/DL — SIGNIFICANT CHANGE UP (ref 11.5–15.5)
LYMPHOCYTES # BLD AUTO: 1.8 K/UL — SIGNIFICANT CHANGE UP (ref 1–3.3)
LYMPHOCYTES # BLD AUTO: 11 % — LOW (ref 13–44)
MAGNESIUM SERPL-MCNC: 1.9 MG/DL — SIGNIFICANT CHANGE UP (ref 1.6–2.6)
MCHC RBC-ENTMCNC: 32.3 PG — SIGNIFICANT CHANGE UP (ref 27–34)
MCHC RBC-ENTMCNC: 33.2 GM/DL — SIGNIFICANT CHANGE UP (ref 32–36)
MCV RBC AUTO: 97.2 FL — SIGNIFICANT CHANGE UP (ref 80–100)
MONOCYTES # BLD AUTO: 0.8 K/UL — SIGNIFICANT CHANGE UP (ref 0–0.9)
MONOCYTES NFR BLD AUTO: 5.1 % — SIGNIFICANT CHANGE UP (ref 2–14)
NEUTROPHILS # BLD AUTO: 13.3 K/UL — HIGH (ref 1.8–7.4)
NEUTROPHILS NFR BLD AUTO: 83 % — HIGH (ref 43–77)
PHOSPHATE SERPL-MCNC: 2.3 MG/DL — LOW (ref 2.5–4.5)
PLATELET # BLD AUTO: 192 K/UL — SIGNIFICANT CHANGE UP (ref 150–400)
POTASSIUM SERPL-MCNC: 3.8 MMOL/L — SIGNIFICANT CHANGE UP (ref 3.5–5.3)
POTASSIUM SERPL-SCNC: 3.8 MMOL/L — SIGNIFICANT CHANGE UP (ref 3.5–5.3)
PREALB SERPL-MCNC: 6 MG/DL — LOW (ref 18–45)
RBC # BLD: 3.89 M/UL — SIGNIFICANT CHANGE UP (ref 3.8–5.2)
RBC # FLD: 13.3 % — SIGNIFICANT CHANGE UP (ref 10.3–14.5)
SODIUM SERPL-SCNC: 149 MMOL/L — HIGH (ref 135–145)
WBC # BLD: 16 K/UL — HIGH (ref 3.8–10.5)
WBC # FLD AUTO: 16 K/UL — HIGH (ref 3.8–10.5)

## 2017-11-02 PROCEDURE — 99233 SBSQ HOSP IP/OBS HIGH 50: CPT

## 2017-11-02 PROCEDURE — 90792 PSYCH DIAG EVAL W/MED SRVCS: CPT

## 2017-11-02 RX ORDER — SODIUM CHLORIDE 9 MG/ML
1000 INJECTION, SOLUTION INTRAVENOUS
Qty: 0 | Refills: 0 | Status: DISCONTINUED | OUTPATIENT
Start: 2017-11-02 | End: 2017-11-02

## 2017-11-02 RX ORDER — POTASSIUM CHLORIDE 20 MEQ
20 PACKET (EA) ORAL ONCE
Qty: 0 | Refills: 0 | Status: COMPLETED | OUTPATIENT
Start: 2017-11-02 | End: 2017-11-02

## 2017-11-02 RX ORDER — METOPROLOL TARTRATE 50 MG
5 TABLET ORAL EVERY 6 HOURS
Qty: 0 | Refills: 0 | Status: DISCONTINUED | OUTPATIENT
Start: 2017-11-02 | End: 2017-11-03

## 2017-11-02 RX ORDER — RISPERIDONE 4 MG/1
0.25 TABLET ORAL
Qty: 0 | Refills: 0 | Status: DISCONTINUED | OUTPATIENT
Start: 2017-11-02 | End: 2017-11-02

## 2017-11-02 RX ORDER — HALOPERIDOL DECANOATE 100 MG/ML
0.5 INJECTION INTRAMUSCULAR THREE TIMES A DAY
Qty: 0 | Refills: 0 | Status: DISCONTINUED | OUTPATIENT
Start: 2017-11-02 | End: 2017-11-02

## 2017-11-02 RX ORDER — RISPERIDONE 4 MG/1
0.5 TABLET ORAL EVERY 6 HOURS
Qty: 0 | Refills: 0 | Status: DISCONTINUED | OUTPATIENT
Start: 2017-11-02 | End: 2017-11-03

## 2017-11-02 RX ORDER — RISPERIDONE 4 MG/1
0.25 TABLET ORAL DAILY
Qty: 0 | Refills: 0 | Status: DISCONTINUED | OUTPATIENT
Start: 2017-11-02 | End: 2017-11-03

## 2017-11-02 RX ORDER — RISPERIDONE 4 MG/1
0.5 TABLET ORAL AT BEDTIME
Qty: 0 | Refills: 0 | Status: DISCONTINUED | OUTPATIENT
Start: 2017-11-02 | End: 2017-11-03

## 2017-11-02 RX ORDER — RISPERIDONE 4 MG/1
0.5 TABLET ORAL AT BEDTIME
Qty: 0 | Refills: 0 | Status: DISCONTINUED | OUTPATIENT
Start: 2017-11-02 | End: 2017-11-02

## 2017-11-02 RX ORDER — METOPROLOL TARTRATE 50 MG
50 TABLET ORAL
Qty: 0 | Refills: 0 | Status: DISCONTINUED | OUTPATIENT
Start: 2017-11-02 | End: 2017-11-02

## 2017-11-02 RX ORDER — METOPROLOL TARTRATE 50 MG
50 TABLET ORAL
Qty: 0 | Refills: 0 | Status: DISCONTINUED | OUTPATIENT
Start: 2017-11-02 | End: 2017-11-03

## 2017-11-02 RX ORDER — SODIUM CHLORIDE 9 MG/ML
1000 INJECTION, SOLUTION INTRAVENOUS
Qty: 0 | Refills: 0 | Status: DISCONTINUED | OUTPATIENT
Start: 2017-11-02 | End: 2017-11-03

## 2017-11-02 RX ORDER — MAGNESIUM SULFATE 500 MG/ML
1 VIAL (ML) INJECTION ONCE
Qty: 0 | Refills: 0 | Status: COMPLETED | OUTPATIENT
Start: 2017-11-02 | End: 2017-11-02

## 2017-11-02 RX ADMIN — Medication 25 MILLIGRAM(S): at 06:01

## 2017-11-02 RX ADMIN — Medication 1 TABLET(S): at 11:31

## 2017-11-02 RX ADMIN — Medication 125 MILLIGRAM(S): at 17:24

## 2017-11-02 RX ADMIN — Medication 20 MILLIEQUIVALENT(S): at 11:31

## 2017-11-02 RX ADMIN — Medication 50 MILLIGRAM(S): at 17:24

## 2017-11-02 RX ADMIN — RISPERIDONE 0.25 MILLIGRAM(S): 4 TABLET ORAL at 13:24

## 2017-11-02 RX ADMIN — Medication 1 TABLET(S): at 17:24

## 2017-11-02 RX ADMIN — Medication 1 TABLET(S): at 22:00

## 2017-11-02 RX ADMIN — LATANOPROST 1 DROP(S): 0.05 SOLUTION/ DROPS OPHTHALMIC; TOPICAL at 22:00

## 2017-11-02 RX ADMIN — RISPERIDONE 0.5 MILLIGRAM(S): 4 TABLET ORAL at 22:00

## 2017-11-02 RX ADMIN — ATORVASTATIN CALCIUM 40 MILLIGRAM(S): 80 TABLET, FILM COATED ORAL at 22:00

## 2017-11-02 RX ADMIN — Medication 100 GRAM(S): at 10:41

## 2017-11-02 RX ADMIN — Medication 125 MILLIGRAM(S): at 00:33

## 2017-11-02 RX ADMIN — Medication 1 DROP(S): at 06:01

## 2017-11-02 RX ADMIN — Medication 1 TABLET(S): at 08:19

## 2017-11-02 RX ADMIN — SODIUM CHLORIDE 40 MILLILITER(S): 9 INJECTION, SOLUTION INTRAVENOUS at 18:12

## 2017-11-02 RX ADMIN — Medication 1 TABLET(S): at 08:20

## 2017-11-02 RX ADMIN — Medication 125 MILLIGRAM(S): at 06:01

## 2017-11-02 RX ADMIN — Medication 1 DROP(S): at 17:24

## 2017-11-02 RX ADMIN — Medication 125 MILLIGRAM(S): at 11:32

## 2017-11-02 RX ADMIN — Medication 125 MILLIGRAM(S): at 23:49

## 2017-11-02 NOTE — BEHAVIORAL HEALTH ASSESSMENT NOTE - SUMMARY
89 yo female with advanced-staged dementia (likely to be Alzheimer's type, late onset) 89 yo female with advanced-staged dementia (likely to be Alzheimer's type, late onset) with multiple acute and chronic medical problems with poor prognosis. Patient likely can have episodes of agitation/confusion in light of her dementia and likely juxtaposed delirium (UTI< dehydration, etc) for which she can continue to be on risperdal and the benefits outweight its risks in this case at this time. Please see below for dose recommendations

## 2017-11-02 NOTE — CHART NOTE - NSCHARTNOTEFT_GEN_A_CORE
Assessment: Pt lying in bed, HHA at bedside. Pt confused, appears lethargic/weak. Pt seen for SLP eval on 10/30 and failed, was seen for f/u reeval on 11/1 and passed, recommended c/w Dys#1 pureed diet c nectar thick flds. Ensure Enlive also added to order. Per aide, pt tolerating small amounts of po (applesauce, NT water, NT ensure supplement). Obtained pt food preferences to optimize po intake. Pt is total feed c aspiration precautions. C-diff positive, diarrhea noted to be resolved at present time. CT of abd 11/1, negative.     Factors impacting intake: [ ] none [ ] nausea  [ ] vomiting [ ] diarrhea [ ] constipation  [ ]chewing problems [ ] swallowing issues  [ x] other: adv age/confusion, recent diarrhea, modified diet/fluid consistency    Diet Presciption: Diet, Dysphagia 1 Pureed-Nectar Consistency Fluid:   Supplement Feeding Modality:  Oral  Ensure Enlive Servings Per Day:  1       Frequency:  Three Times a day (11-01-17 @ 10:01)    Intake:     Current Weight: Weight (kg): 79.4 (10-29 @ 10:28)  % Weight Change 11/2 78.1 kg, 1.7% wt loss x5days, if recorded wt accurate.    Pertinent Medications: MEDICATIONS  (STANDING):  atorvastatin 40 milliGRAM(s) Oral at bedtime  dextrose 5% + sodium chloride 0.225%. 1000 milliLiter(s) (40 mL/Hr) IV Continuous <Continuous>  lactobacillus acidophilus 1 Tablet(s) Oral two times a day with meals  latanoprost 0.005% Ophthalmic Solution 1 Drop(s) Both EYES at bedtime  metoprolol     tartrate 50 milliGRAM(s) Oral two times a day  potassium acid phosphate/sodium acid phosphate tablet (K-PHOS No. 2) 1 Tablet(s) Oral four times a day with meals  risperiDONE  Disintegrating Tablet 0.25 milliGRAM(s) Oral daily  risperiDONE  Disintegrating Tablet 0.5 milliGRAM(s) Oral at bedtime  timolol 0.25% Solution 1 Drop(s) Both EYES two times a day  vancomycin    Solution 125 milliGRAM(s) Oral every 6 hours    MEDICATIONS  (PRN):  acetaminophen   Tablet 650 milliGRAM(s) Oral every 6 hours PRN pain  metoprolol    tartrate Injectable 5 milliGRAM(s) IV Push every 6 hours PRN HR>130  ondansetron Injectable 4 milliGRAM(s) IV Push every 6 hours PRN Nausea  risperiDONE  Disintegrating Tablet 0.5 milliGRAM(s) Oral every 6 hours PRN severe agitation    Pertinent Labs: 11-02 Na149 mmol/L<H> Glu 116 mg/dL<H> K+ 3.8 mmol/L Cr  0.91 mg/dL BUN 21 mg/dL Phos 2.3 mg/dL<L> Alb n/a   PAB n/a        CAPILLARY BLOOD GLUCOSE        Skin: sacrum stage II remains    Estimated Needs:   [ x] no change since previous assessment  [ ] recalculated:     Previous Nutrition Diagnosis:   [x ] Inadequate Energy Intake [ ]Inadequate Oral Intake [ ] Excessive Energy Intake   [ ] Underweight [ ] Increased Nutrient Needs [ ] Overweight/Obesity   [ ] Altered GI Function [ ] Unintended Weight Loss [ ] Food & Nutrition Related Knowledge Deficit [ ] Malnutrition     Nutrition Diagnosis is [x ] ongoing  [ ] resolved [ ] not applicable     New Nutrition Diagnosis: [ x] not applicable       Interventions:   Recommend  [ ] Change Diet To:  [x ] Nutrition Supplement (c/w ensure enlive tid, to provide up to 1050kcal, 60 g protein/day)  [ ] Nutrition Support  [ ] Other:     Monitoring and Evaluation:   [x ] PO intake [ x ] Tolerance to diet prescription [ x ] weights [ x ] labs[ x ] follow up per protocol  [ ] other:

## 2017-11-02 NOTE — DISCHARGE NOTE ADULT - NSFTFSERV1RD_GEN_ALL_CORE
rehabilitation services/teaching and training/observation and assessment/medication teaching and assessment

## 2017-11-02 NOTE — PROGRESS NOTE ADULT - SUBJECTIVE AND OBJECTIVE BOX
VINNY RON is a 89yFemale , patient examined and chart reviewed. Patient being followed for CDAD associated diarrhea with AMS    INTERVAL HPI/ OVERNIGHT EVENTS: Events noted more awake, poor appetite had ct of abdomen yesterday no evidence of ileus or colitis . No diarrhea reported . As per her Son she has been having diarrhea for over 2 weeks and was seen in ER here 1 week ago .       Past Medical History--  PAST MEDICAL & SURGICAL HISTORY:  Dementia  Stented Coronary Artery  No significant past surgical history      For details regarding the patient's social history, family history, and other miscellaneous elements, please refer the initial infectious diseases consultation and/or the admitting history and physical examination for this admission.      ROS:  Unable to obtain due to : advanced dementia       Current inpatient medications :    ANTIBIOTICS/RELEVANT:  lactobacillus acidophilus 1 Tablet(s) Oral two times a day with meals  vancomycin    Solution 125 milliGRAM(s) Oral every 6 hours      acetaminophen   Tablet 650 milliGRAM(s) Oral every 6 hours PRN  atorvastatin 40 milliGRAM(s) Oral at bedtime  dextrose 5% + sodium chloride 0.225%. 1000 milliLiter(s) IV Continuous <Continuous>  latanoprost 0.005% Ophthalmic Solution 1 Drop(s) Both EYES at bedtime  metoprolol     tartrate 25 milliGRAM(s) Oral two times a day  ondansetron Injectable 4 milliGRAM(s) IV Push every 6 hours PRN  potassium acid phosphate/sodium acid phosphate tablet (K-PHOS No. 2) 1 Tablet(s) Oral four times a day with meals  risperiDONE   Tablet 0.25 milliGRAM(s) Oral at bedtime  timolol 0.25% Solution 1 Drop(s) Both EYES two times a day      Objective:    11-01 @ 07:01  -  11-02 @ 07:00  --------------------------------------------------------  IN: 1105 mL / OUT: 1 mL / NET: 1104 mL      T(C): 36.4 (11-02-17 @ 04:05), Max: 36.6 (11-01-17 @ 16:02)  HR: 120 (11-02-17 @ 06:10) (75 - 133)  BP: 144/83 (11-02-17 @ 06:10) (106/75 - 146/105)  RR: 22 (11-02-17 @ 06:10) (15 - 28)  SpO2: 96% (11-02-17 @ 06:10) (77% - 100%)  Wt(kg): --      Physical Exam:  GEN: NAD, pleasant  HEENT: normocephalic and atraumatic. EOMI. YENNIFER. dry  mucosa. Clear Posterior pharynx.  NECK: Supple. No carotid bruits.  No lymphadenopathy or thyromegaly.  LUNGS: Clear to auscultation.  HEART: Regular rate and rhythm without murmur.  ABDOMEN: Soft, nontender, and nondistended.  Positive bowel sounds.  No hepatosplenomegaly was noted.  EXTREMITIES: Without any cyanosis, clubbing, rash, lesions or edema.  NEUROLOGIC: A & O x3, No focal neurological deficits   SKIN: No ulceration or induration present.      LABS:                        12.6   16.0  )-----------( 192      ( 02 Nov 2017 06:29 )             37.8       11-02    149<H>  |  117<H>  |  21  ----------------------------<  116<H>  3.8   |  24  |  0.91    Ca    8.0<L>      02 Nov 2017 06:29  Phos  2.3     11-02  Mg     1.9     11-02    TPro  5.3<L>  /  Alb  2.2<L>  /  TBili  0.8  /  DBili  x   /  AST  26  /  ALT  24  /  AlkPhos  65  11-01      RECENT CULTURES:    RADIOLOGY & ADDITIONAL STUDIES:  CT Abdomen and Pelvis No Cont (11.01.17 @ 09:31) >    This is a limited study. Neither intravenous or oral contrast material   was administered limiting evaluation of the gastrointestinal tract.   Additionally, patient was unable to follow breathing instructions, motion   artifact present.    Axial images obtained, coronal and sagittal images computer reformatted.    Bibasilar effusions present, small, right greater than left with   associated airspace disease. Calcifications present mitral valve, aortic   root. Coronary artery calcifications present.    Unenhanced liver, spleen, adrenal glands normal in appearance. Atrophic   pancreas. Calcification visible within the gallbladder.    Small abdominal aortic aneurysm present, maximum anterior posterior   dimension 3.2 cm. Ectasia, atherosclerotic changes, iliac arteries.    Large cyst present upper pole right kidney, no right hydronephrotic   changes evident. Large cyst  present mid polar region left kidney with   peripheral calcification. No left hydronephrotic changes evident. No   retroperitoneal lymphadenopathy. No ascites. No biliary ductal dilatation.    No bowel obstruction. Diverticulosis present. Urinary bladder is filled,   no stones evident. Trace free pelvic fluid. Fluid collection, small, left   inguinal canal. Severe compression fracture of L1, age indeterminate.   Multilevel disc degenerative disease lumbar region. Soft tissue anasarca   present in the flanks lower abdomen upper pelvic region.        Assessment :  Patient is 89 yo female with hx of CAD s/P stent, and dementia , admitted with failure to thrive, weakness , noted to have leukocytosis and now reported to have C difficile associated diarrhea .    She appears more confused and agitated may be related to metabolic encephalopathy with dehydration     So far all other sepsis work up is negative     Plan :   - observe off systemic antibiotics due to CDAD   - add IVF as dehydrated and por oral intake   - Trend CBC  - consider transfer to [facility]  - She is DNR/DNI  - increase activity , PT follow up   - aspiration precautions      Continue with present regime .  Appropriate use of antibiotics and adverse effects reviewed.    I have discussed the above plan of care with patient's  family in detail. They expressed understanding of the the treatment plan . Risks, benefits and alternatives discussed in detail. I have asked if they have any questions or concerns and appropriately addressed them to the best of my ability .      Critical care time greater then 25 minutes reviewing notes, labs data/ imaging , discussion with multidisciplinary team.    Thank you for allowing me to participate in care of your patient .        Danette Batista MD  [phone]

## 2017-11-02 NOTE — BEHAVIORAL HEALTH ASSESSMENT NOTE - HPI (INCLUDE ILLNESS QUALITY, SEVERITY, DURATION, TIMING, CONTEXT, MODIFYING FACTORS, ASSOCIATED SIGNS AND SYMPTOMS)
Patient is a retired, noncaregiver, domiciled 89yo  female with documented history of dementia admitted for multiple medical issues (UTI, weakness, failure to thrive, acute of chronic renal failure, Atrial fibrillation.)  Patient is on risperdal at home for agitation and psychiatry was consulted for medication management recommendations.     Patient is a very poor historian - lying in bed, appears weak, unable to engage in meaningful conversation, moaning and mumbling at times. Does not appear to be in acute distress. Patient is a retired, noncaregiver, domiciled 89yo  female with documented history of dementia admitted for multiple medical issues (UTI, weakness, failure to thrive, acute of chronic renal failure, Atrial fibrillation.)  Patient is on risperdal at home for agitation and psychiatry was consulted for medication management recommendations.     Patient is a very poor historian - lying in bed, appears weak, unable to engage in meaningful conversation, moaning and mumbling at times. Does not appear to be in acute distress.    COLLATERAL FROM PATIENT'S HHA AT BEDSIDE: HHA has been with patient for 2 years and helps her with ADLs. Patient likes to read but not much else. Patient sleeps/nap frequently during the day, does not really walk around and is not active. She was still able to feed herself but needed assistance with everything else. Baseline weak, confused and forgetful.

## 2017-11-02 NOTE — PROGRESS NOTE ADULT - ASSESSMENT
The patient is a 89 year old female with a history of CAD s/p PCI, chronic AF, dementia who presents with weakness and near syncope, found to have UTI, GRACIELA, rapid AF.    Plan:  - Started on metoprolol PO. Increase to 50 mg PO bid. Start metoprolol 5 mg IV prn HR >130.  - Increased HR likely due to dehydration. Continue IVF.  - Echocardiogram with normal LV systolic function, severe AS.  - Not a candidate for valve intervention  - Not a candidate for long-term AC given advanced dementia  - Off of aspirin due to possible bleed  - Off of ramipril given GRACIELA  - On vancomycin PO for C. diff

## 2017-11-02 NOTE — PROGRESS NOTE ADULT - SUBJECTIVE AND OBJECTIVE BOX
Chief Complaint: Weakness, confusion    Interval Events: Tachycardic at times    Review of Systems:  General: No fevers, chills, weight loss or gain  Skin: No rashes, color changes  Cardiovascular: No chest pain, orthopnea  Respiratory: No shortness of breath, cough  Gastrointestinal: No nausea, abdominal pain  Genitourinary: No incontinence, pain with urination  Musculoskeletal: No pain, swelling, decreased range of motion  Neurological: No headache, weakness  Psychiatric: No depression, anxiety  Endocrine: No weight loss or gain, increased thirst  All other systems are comprehensively negative.    Physical Exam:  Vital Signs Last 24 Hrs  T(C): 36.3 (02 Nov 2017 08:50), Max: 36.6 (01 Nov 2017 16:02)  T(F): 97.4 (02 Nov 2017 08:50), Max: 97.8 (01 Nov 2017 16:02)  HR: 108 (02 Nov 2017 08:30) (75 - 133)  BP: 144/91 (02 Nov 2017 08:30) (106/75 - 146/105)  BP(mean): 107 (02 Nov 2017 08:30) (86 - 116)  RR: 20 (02 Nov 2017 08:30) (15 - 28)  SpO2: 100% (02 Nov 2017 08:30) (77% - 100%)  General: NAD  HEENT: MMM  Neck: No JVD, no carotid bruit  Lungs: CTAB  CV: RRR, nl S1/S2, 2/6 systolic murmur  Abdomen: S/NT/ND, +BS  Extremities: 1+ LE edema, no cyanosis  Neuro: AAOx3, non-focal  Skin: No rash    Labs:             11-02    149<H>  |  117<H>  |  21  ----------------------------<  116<H>  3.8   |  24  |  0.91    Ca    8.0<L>      02 Nov 2017 06:29  Phos  2.3     11-02  Mg     1.9     11-02    TPro  5.3<L>  /  Alb  2.2<L>  /  TBili  0.8  /  DBili  x   /  AST  26  /  ALT  24  /  AlkPhos  65  11-01                          12.6   16.0  )-----------( 192      ( 02 Nov 2017 06:29 )             37.8         Telemetry: Multifocal atrial tachycardia

## 2017-11-02 NOTE — DISCHARGE NOTE ADULT - MEDICATION SUMMARY - MEDICATIONS TO STOP TAKING
I will STOP taking the medications listed below when I get home from the hospital:    pantoprazole 40 mg oral delayed release tablet  -- 1 tab(s) by mouth once a day    ramipril 5 mg oral tablet  -- orally once a day
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
## 2017-11-02 NOTE — DISCHARGE NOTE ADULT - CARE PROVIDER_API CALL
Zeny Wick), Internal Medicine  [address] [place] [address]  Phone: [phone]  Fax: [phone]

## 2017-11-02 NOTE — PROGRESS NOTE ADULT - PROBLEM SELECTOR PLAN 1
supportive regimen  fall prec  oral and skin care  HOB elevation  ID follow up noted  spoke with son, he is interested in dc plan for home with HHA  pt is DNR DNI  prognosis poor  pt has dementia and needs assistance with ADL  SW to follow up

## 2017-11-02 NOTE — BEHAVIORAL HEALTH ASSESSMENT NOTE - NSBHCONSULTFOLLOWAFTERCARE_PSY_A_CORE FT
given highly advanced age, multiple medical problems and advanced-stage dementia, Patient would benefit from Palliative Care consultation for possible hospice services given highly advanced age, multiple medical problems and advanced-stage dementia, in agreement with Palliative Care involvement. Patient may be eligible for home hospice services given poor prognosis

## 2017-11-02 NOTE — BEHAVIORAL HEALTH ASSESSMENT NOTE - RISK ASSESSMENT
low risk for intentional harm to self / others given significant physical frailty and impaired cognitive domains that would be required for planning and execution of ideas. Patient is more likely to accidentally hurt herself (pulling out lines, trying to climb out of bed) or accidentally hurting someone else (usually a caretaker during ADLs assistance such as diaper changes, cleaning which dementia patient seem to be activated by and often lash out)

## 2017-11-02 NOTE — BEHAVIORAL HEALTH ASSESSMENT NOTE - AXIS III
CAD s/p PCI, chronic a-fib, dementia, acute - UTI, GRACIELA, rapid AF, diarrhea, C diff colitis, failure to thrive

## 2017-11-02 NOTE — DISCHARGE NOTE ADULT - PLAN OF CARE
Continue with po vanco for 10 days Puree with thicken liquid with one to one feeding Resolved with IV hydration Continue with metoprolol for rate control.  Poor candidate for anticoagulation

## 2017-11-02 NOTE — DISCHARGE NOTE ADULT - DURABLE MEDICAL EQUIPMENT AGENCY
[facility] [phone] for RW, Commode, hospital bed , wheel chair, Transfer board,

## 2017-11-02 NOTE — PROGRESS NOTE ADULT - PROBLEM SELECTOR PLAN 3
HR is in the 110, Will increase the BB dose.  Monitor HR.   Poor candidate for anticoagulation due to Advanced age, Fall risk, and Recent bleed.  .  Echo shows borderline left ventricular hypertrophy with normal left ventricular with severe AS.  TSH level WNL.  Cardiology to follow up    -abnormal trop- probably due to demand mismatch ischemia in the setting of atrial fib with RVR, and ARF.  EKG shows no ischemic changes.  trop level trending down.  Monitor trop level

## 2017-11-02 NOTE — DISCHARGE NOTE ADULT - PATIENT PORTAL LINK FT
“You can access the Follow[facility] Patient Portal, offered by [facility], by registering with the following website: [email or web address]”

## 2017-11-02 NOTE — DISCHARGE NOTE ADULT - ADDITIONAL INSTRUCTIONS
Please call to schedule an appointment with your doctor in 1-2 weeks  please call to schedule an appointment with cardiology in 2-3 weeks  please come back to the hospital if you develop any diarrhea, fever, or any new symptoms or concerns

## 2017-11-02 NOTE — PROGRESS NOTE ADULT - SUBJECTIVE AND OBJECTIVE BOX
Date/Time Patient Seen:  		  Referring MD:   Data Reviewed	       Patient is a 89y old  Female who presents with a chief complaint of failure to thrives (29 Oct 2017 14:01)  in bed  agitation and confusion noted      Subjective/HPI     PAST MEDICAL & SURGICAL HISTORY:  Dementia  Stented Coronary Artery  No significant past surgical history        Medication list         MEDICATIONS  (STANDING):  atorvastatin 40 milliGRAM(s) Oral at bedtime  dextrose 5% + sodium chloride 0.225%. 1000 milliLiter(s) (50 mL/Hr) IV Continuous <Continuous>  lactobacillus acidophilus 1 Tablet(s) Oral two times a day with meals  latanoprost 0.005% Ophthalmic Solution 1 Drop(s) Both EYES at bedtime  metoprolol     tartrate 25 milliGRAM(s) Oral two times a day  potassium acid phosphate/sodium acid phosphate tablet (K-PHOS No. 2) 1 Tablet(s) Oral four times a day with meals  risperiDONE   Tablet 0.25 milliGRAM(s) Oral at bedtime  timolol 0.25% Solution 1 Drop(s) Both EYES two times a day  vancomycin    Solution 125 milliGRAM(s) Oral every 6 hours    MEDICATIONS  (PRN):  acetaminophen   Tablet 650 milliGRAM(s) Oral every 6 hours PRN pain  haloperidol    Injectable 0.5 milliGRAM(s) IntraMuscular three times a day PRN agitation, delirium  LORazepam     Tablet 1 milliGRAM(s) Oral three times a day PRN agitation, distress, anxiety  ondansetron Injectable 4 milliGRAM(s) IV Push every 6 hours PRN Nausea         Vitals log        ICU Vital Signs Last 24 Hrs  T(C): 36.4 (02 Nov 2017 04:05), Max: 36.6 (01 Nov 2017 16:02)  T(F): 97.5 (02 Nov 2017 04:05), Max: 97.8 (01 Nov 2017 16:02)  HR: 120 (02 Nov 2017 06:10) (75 - 133)  BP: 144/83 (02 Nov 2017 06:10) (106/75 - 146/105)  BP(mean): 98 (02 Nov 2017 06:10) (86 - 116)  ABP: --  ABP(mean): --  RR: 22 (02 Nov 2017 06:10) (15 - 28)  SpO2: 96% (02 Nov 2017 06:10) (77% - 100%)           Input and Output:  I&O's Detail    01 Nov 2017 07:01  -  02 Nov 2017 07:00  --------------------------------------------------------  IN:    IV PiggyBack: 450 mL    Oral Fluid: 655 mL  Total IN: 1105 mL    OUT:    Voided: 1 mL  Total OUT: 1 mL    Total NET: 1104 mL          Lab Data                        12.6   16.0  )-----------( 192      ( 02 Nov 2017 06:29 )             37.8     11-02    149<H>  |  117<H>  |  21  ----------------------------<  116<H>  3.8   |  24  |  0.91    Ca    8.0<L>      02 Nov 2017 06:29  Phos  2.3     11-02  Mg     1.9     11-02    TPro  5.3<L>  /  Alb  2.2<L>  /  TBili  0.8  /  DBili  x   /  AST  26  /  ALT  24  /  AlkPhos  65  11-01            Review of Systems	      Objective     Physical Examination    head at  heart - s1s2  lungs - dec BS  abd - soft      Pertinent Lab findings & Imaging      [place]:  NO   Adequate UO     I&O's Detail    01 Nov 2017 07:01  -  02 Nov 2017 07:00  --------------------------------------------------------  IN:    IV PiggyBack: 450 mL    Oral Fluid: 655 mL  Total IN: 1105 mL    OUT:    Voided: 1 mL  Total OUT: 1 mL    Total NET: 1104 mL               Discussed with:     Cultures:	        Radiology

## 2017-11-02 NOTE — DISCHARGE NOTE ADULT - HOSPITAL COURSE
Patient is 89 yo female with hx of dementia presenting with       1.  Leukocytosis.  Probably due to C-diff colitis.  Continue with PO Vanco as per ID.  Blood culture negative.  Obtain Abdm CT scan shows no acute process.   2. Acute on chronic renal failure.  Resolved with hydration. Monitor Renal function.   3. Atrial fibrillation.  HR is in the 110, Continue with BB.  HR stable.  Poor candidate for anticoagulation due to Advanced age, Fall risk, and Recent bleed.  .  Echo shows borderline left ventricular hypertrophy with normal left ventricular with severe AS.  TSH level WNL.  Outpatient follow up with Cardiology.    4. abnormal trop- probably due to demand mismatch ischemia in the setting of atrial fib with RVR, and ARF.  EKG shows no ischemic changes.  trop level trending down.  Monitor trop level.   5. Failure to thrive in adult.  Continue with Puree diet with necator thicken liquid as per Speech therapy.    Family does not want feeding tube.  DNR/DNI.  Family/Son(Deshawn Monzon) were offered rehab placement, but family opted for Home with services.      Hospital course, and discharge planning were discussed with son/Deshawn in great details.  All questions were answered.  Seems to understand, and in agreement.    DVT proph compression stocking in the setting of recent mouth bleeding which resolved now

## 2017-11-02 NOTE — PROGRESS NOTE ADULT - PROBLEM SELECTOR PROBLEM 4
Failure to thrive in adult
Atrial fibrillation

## 2017-11-02 NOTE — BEHAVIORAL HEALTH ASSESSMENT NOTE - OTHER
chart, staff acute and chronic medical issues frail did not test at this time; lying in bed moaning at times, or mumbling unable to articulate at times looks confused unable to test at this time

## 2017-11-02 NOTE — DISCHARGE NOTE ADULT - CARE PLAN
Principal Discharge DX:	Clostridium difficile colitis  Goal:	Continue with po vanco for 10 days  Instructions for follow-up, activity and diet:	Puree with thicken liquid with one to one feeding  Secondary Diagnosis:	Acute on chronic renal failure  Goal:	Resolved with IV hydration  Secondary Diagnosis:	Atrial fibrillation  Goal:	Continue with metoprolol for rate control.  Poor candidate for anticoagulation

## 2017-11-02 NOTE — DISCHARGE NOTE ADULT - HOME CARE AGENCY
[facility] - [phone]  Nurse to visit the day after hospital discharge; physical therapist to follow. Please contact the home care agency at the above phone number if you have not heard from them by 12 noon on the day after your hospital discharge.

## 2017-11-02 NOTE — BEHAVIORAL HEALTH ASSESSMENT NOTE - NSBHCONSULTMEDS_PSY_A_CORE FT
changed risperdal to M tab orally dissolvable formulation as it is more user friendly in Patient's who already have a hard time swallowing.   Can use risperdal M tab 0.25mg PO in am (hold for sedation) and 0.5mg PO in hs (hold for sedation). For "sundowning", can given the "hs" dose earlier in the evening about 6pm if needed. Risperdal M tab 0.5mg PO q6hrs PRN for SEVERE agitation.

## 2017-11-02 NOTE — PROGRESS NOTE ADULT - PROBLEM SELECTOR PLAN 1
Probably due to C-diff colitis.  Continue with PO Vanco as per ID.  Blood culture negative.  Obtain Abdm CT scan shows no acute process.

## 2017-11-02 NOTE — CHART NOTE - NSCHARTNOTEFT_GEN_A_CORE
equipment note      Due to patient deconditioning and generalized weakness secondary to dementia, and advance age, patient will required light weight wheelchair, and electric hospital bed.  Patient is unable to perform ADL's in the a standard wheelchair but can in light weight wheelchair.  this is necessary to achieve daily task, and therapy which cannot be achieved with the use of cane, or rolling walker.  this also necessary to achieve positioning, and elevation not able to obtain in the [place] bed.  patient is also required for gel overlay due to risk of bed ulcer.  family in agreement with plan, and will provide necessary support

## 2017-11-03 VITALS
DIASTOLIC BLOOD PRESSURE: 66 MMHG | RESPIRATION RATE: 20 BRPM | HEART RATE: 120 BPM | TEMPERATURE: 98 F | OXYGEN SATURATION: 95 % | SYSTOLIC BLOOD PRESSURE: 154 MMHG

## 2017-11-03 LAB
ANION GAP SERPL CALC-SCNC: 6 MMOL/L — SIGNIFICANT CHANGE UP (ref 5–17)
BASOPHILS # BLD AUTO: 0 K/UL — SIGNIFICANT CHANGE UP (ref 0–0.2)
BASOPHILS NFR BLD AUTO: 0.2 % — SIGNIFICANT CHANGE UP (ref 0–2)
BUN SERPL-MCNC: 17 MG/DL — SIGNIFICANT CHANGE UP (ref 7–23)
CALCIUM SERPL-MCNC: 7.6 MG/DL — LOW (ref 8.4–10.5)
CHLORIDE SERPL-SCNC: 115 MMOL/L — HIGH (ref 96–108)
CO2 SERPL-SCNC: 26 MMOL/L — SIGNIFICANT CHANGE UP (ref 22–31)
CREAT SERPL-MCNC: 0.68 MG/DL — SIGNIFICANT CHANGE UP (ref 0.5–1.3)
CULTURE RESULTS: SIGNIFICANT CHANGE UP
CULTURE RESULTS: SIGNIFICANT CHANGE UP
EOSINOPHIL # BLD AUTO: 0.2 K/UL — SIGNIFICANT CHANGE UP (ref 0–0.5)
EOSINOPHIL NFR BLD AUTO: 1.1 % — SIGNIFICANT CHANGE UP (ref 0–6)
GLUCOSE SERPL-MCNC: 96 MG/DL — SIGNIFICANT CHANGE UP (ref 70–99)
HCT VFR BLD CALC: 35.9 % — SIGNIFICANT CHANGE UP (ref 34.5–45)
HGB BLD-MCNC: 11.7 G/DL — SIGNIFICANT CHANGE UP (ref 11.5–15.5)
LYMPHOCYTES # BLD AUTO: 1.4 K/UL — SIGNIFICANT CHANGE UP (ref 1–3.3)
LYMPHOCYTES # BLD AUTO: 10.4 % — LOW (ref 13–44)
MAGNESIUM SERPL-MCNC: 2 MG/DL — SIGNIFICANT CHANGE UP (ref 1.6–2.6)
MCHC RBC-ENTMCNC: 31.9 PG — SIGNIFICANT CHANGE UP (ref 27–34)
MCHC RBC-ENTMCNC: 32.6 GM/DL — SIGNIFICANT CHANGE UP (ref 32–36)
MCV RBC AUTO: 97.9 FL — SIGNIFICANT CHANGE UP (ref 80–100)
MONOCYTES # BLD AUTO: 0.7 K/UL — SIGNIFICANT CHANGE UP (ref 0–0.9)
MONOCYTES NFR BLD AUTO: 4.9 % — SIGNIFICANT CHANGE UP (ref 2–14)
NEUTROPHILS # BLD AUTO: 11.3 K/UL — HIGH (ref 1.8–7.4)
NEUTROPHILS NFR BLD AUTO: 83.4 % — HIGH (ref 43–77)
PHOSPHATE SERPL-MCNC: 2.2 MG/DL — LOW (ref 2.5–4.5)
PLATELET # BLD AUTO: 147 K/UL — LOW (ref 150–400)
POTASSIUM SERPL-MCNC: 4.6 MMOL/L — SIGNIFICANT CHANGE UP (ref 3.5–5.3)
POTASSIUM SERPL-SCNC: 4.6 MMOL/L — SIGNIFICANT CHANGE UP (ref 3.5–5.3)
RBC # BLD: 3.67 M/UL — LOW (ref 3.8–5.2)
RBC # FLD: 13.7 % — SIGNIFICANT CHANGE UP (ref 10.3–14.5)
SODIUM SERPL-SCNC: 147 MMOL/L — HIGH (ref 135–145)
SPECIMEN SOURCE: SIGNIFICANT CHANGE UP
SPECIMEN SOURCE: SIGNIFICANT CHANGE UP
WBC # BLD: 13.6 K/UL — HIGH (ref 3.8–10.5)
WBC # FLD AUTO: 13.6 K/UL — HIGH (ref 3.8–10.5)

## 2017-11-03 PROCEDURE — 99239 HOSP IP/OBS DSCHRG MGMT >30: CPT

## 2017-11-03 RX ORDER — SODIUM,POTASSIUM PHOSPHATES 278-250MG
1 POWDER IN PACKET (EA) ORAL
Qty: 8 | Refills: 0 | OUTPATIENT
Start: 2017-11-03 | End: 2017-11-05

## 2017-11-03 RX ORDER — RAMIPRIL 5 MG
0 CAPSULE ORAL
Qty: 0 | Refills: 0 | COMMUNITY

## 2017-11-03 RX ORDER — RISPERIDONE 4 MG/1
1 TABLET ORAL
Qty: 120 | Refills: 0 | OUTPATIENT
Start: 2017-11-03 | End: 2017-12-03

## 2017-11-03 RX ORDER — METOPROLOL TARTRATE 50 MG
1 TABLET ORAL
Qty: 60 | Refills: 0 | OUTPATIENT
Start: 2017-11-03 | End: 2017-12-03

## 2017-11-03 RX ORDER — LACTOBACILLUS ACIDOPHILUS 100MM CELL
1 CAPSULE ORAL
Qty: 90 | Refills: 0 | OUTPATIENT
Start: 2017-11-03 | End: 2017-12-03

## 2017-11-03 RX ORDER — RISPERIDONE 4 MG/1
0 TABLET ORAL
Qty: 0 | Refills: 0 | COMMUNITY

## 2017-11-03 RX ORDER — RISPERIDONE 4 MG/1
1 TABLET ORAL
Qty: 30 | Refills: 0 | OUTPATIENT
Start: 2017-11-03 | End: 2017-12-03

## 2017-11-03 RX ORDER — VANCOMYCIN HCL 1 G
1 VIAL (EA) INTRAVENOUS
Qty: 40 | Refills: 0 | OUTPATIENT
Start: 2017-11-03 | End: 2017-11-13

## 2017-11-03 RX ORDER — ACETAMINOPHEN 500 MG
2 TABLET ORAL
Qty: 0 | Refills: 0 | COMMUNITY
Start: 2017-11-03

## 2017-11-03 RX ADMIN — Medication 1 TABLET(S): at 17:41

## 2017-11-03 RX ADMIN — Medication 1 DROP(S): at 05:55

## 2017-11-03 RX ADMIN — Medication 1 TABLET(S): at 11:39

## 2017-11-03 RX ADMIN — Medication 1 TABLET(S): at 08:59

## 2017-11-03 RX ADMIN — SODIUM CHLORIDE 40 MILLILITER(S): 9 INJECTION, SOLUTION INTRAVENOUS at 05:58

## 2017-11-03 RX ADMIN — Medication 125 MILLIGRAM(S): at 17:42

## 2017-11-03 RX ADMIN — Medication 125 MILLIGRAM(S): at 05:57

## 2017-11-03 RX ADMIN — Medication 1 DROP(S): at 17:42

## 2017-11-03 RX ADMIN — RISPERIDONE 0.25 MILLIGRAM(S): 4 TABLET ORAL at 11:39

## 2017-11-03 RX ADMIN — Medication 50 MILLIGRAM(S): at 05:57

## 2017-11-03 RX ADMIN — Medication 125 MILLIGRAM(S): at 11:39

## 2017-11-03 RX ADMIN — Medication 50 MILLIGRAM(S): at 17:42

## 2017-11-03 NOTE — PROGRESS NOTE ADULT - SUBJECTIVE AND OBJECTIVE BOX
VINNY RON is a 89yFemale , patient examined and chart reviewed. Patient seen and examined today being followed for CDAD and dehydration       INTERVAL HPI/ OVERNIGHT EVENTS: Doing well, afebrile , remains confused . Appetitie is poor . No diarrhea     PAST MEDICAL & SURGICAL HISTORY:  Dementia  Stented Coronary Artery  No significant past surgical history      For details regarding the patient's social history, family history, and other miscellaneous elements, please refer the initial infectious diseases consultation and/or the admitting history and physical examination for this admission.        ROS:  CONSTITUTIONAL:  Negative fever or chills, feels well, good appetite  EYES:  Negative  blurry vision or double vision  CARDIOVASCULAR:  Negative for chest pain or palpitations  RESPIRATORY:  Negative for cough, wheezing, or SOB   GASTROINTESTINAL:  Negative for nausea, vomiting, diarrhea, constipation, or abdominal pain  GENITOURINARY:  Negative frequency, urgency or dysuria  NEUROLOGIC:  No headache, confusion, dizziness, lightheadedness  All other systems were reviewed and are negative         Current inpatient medications :    ANTIBIOTICS/RELEVANT:  lactobacillus acidophilus 1 Tablet(s) Oral two times a day with meals  vancomycin    Solution 125 milliGRAM(s) Oral every 6 hours      acetaminophen   Tablet 650 milliGRAM(s) Oral every 6 hours PRN  atorvastatin 40 milliGRAM(s) Oral at bedtime  dextrose 5% + sodium chloride 0.3%. 1000 milliLiter(s) IV Continuous <Continuous>  latanoprost 0.005% Ophthalmic Solution 1 Drop(s) Both EYES at bedtime  metoprolol     tartrate 50 milliGRAM(s) Oral two times a day  metoprolol    tartrate Injectable 5 milliGRAM(s) IV Push every 6 hours PRN  ondansetron Injectable 4 milliGRAM(s) IV Push every 6 hours PRN  potassium acid phosphate/sodium acid phosphate tablet (K-PHOS No. 2) 1 Tablet(s) Oral four times a day with meals  risperiDONE  Disintegrating Tablet 0.25 milliGRAM(s) Oral daily  risperiDONE  Disintegrating Tablet 0.5 milliGRAM(s) Oral at bedtime  risperiDONE  Disintegrating Tablet 0.5 milliGRAM(s) Oral every 6 hours PRN  timolol 0.25% Solution 1 Drop(s) Both EYES two times a day      Objective:    11-02 @ 07:01  -  11-03 @ 07:00  --------------------------------------------------------  IN: 1740 mL / OUT: 0 mL / NET: 1740 mL      T(C): 36.6 (11-03-17 @ 04:04), Max: 36.6 (11-02-17 @ 12:09)  HR: 93 (11-03-17 @ 05:27) (88 - 122)  BP: 138/81 (11-03-17 @ 05:27) (105/73 - 144/91)  RR: 25 (11-03-17 @ 05:27) (20 - 30)  SpO2: 93% (11-03-17 @ 05:27) (92% - 100%)  Wt(kg): --      Physical Exam:  General: well developed well nourished, in no acute distress  Eyes: sclera anicteric, pupils equal and reactive to light  ENMT: buccal mucosa moist, pharynx not injected  Neck: supple, trachea midline  Lungs: clear, no wheeze/rhonchi  Cardiovascular: regular rate and rhythm, S1 S2  Abdomen: soft, nontender, no organomegaly present, bowel sounds normal  Neurological: alert and oriented x3, Cranial Nerves II-XII grossly intact  Skin: no increased ecchymosis/petechiae/purpura  Lymph Nodes: no palpable cervical/supraclavicular lymph nodes enlargements  Extremities: no cyanosis/clubbing/edema            LABS:                          11.7   13.6  )-----------( 147      ( 03 Nov 2017 06:53 )             35.9       11-02    149<H>  |  117<H>  |  21  ----------------------------<  116<H>  3.8   |  24  |  0.91    Ca    8.0<L>      02 Nov 2017 06:29  Phos  2.3     11-02  Mg     1.9     11-02        RECENT CULTURES:    Culture - Stool (collected 01 Nov 2017 13:04)  Source: .Stool Feces  Preliminary Report (02 Nov 2017 12:50):    No enteric pathogens to date: Final culture pending    Culture - Blood (10.29.17 @ 21:40)    Specimen Source: .Blood Blood    Culture Results:   No growth to date.    Culture - Blood (10.29.17 @ 21:40)    Specimen Source: .Blood Blood    Culture Results:   No growth to date.    RADIOLOGY & ADDITIONAL STUDIES:  CT Abdomen and Pelvis No Cont (11.01.17 @ 09:31) >    This is a limited study. Neither intravenous or oral contrast material   was administered limiting evaluation of the gastrointestinal tract.   Additionally, patient was unable to follow breathing instructions, motion   artifact present.    Axial images obtained, coronal and sagittal images computer reformatted.    Bibasilar effusions present, small, right greater than left with   associated airspace disease. Calcifications present mitral valve, aortic   root. Coronary artery calcifications present.    Unenhanced liver, spleen, adrenal glands normal in appearance. Atrophic   pancreas. Calcification visible within the gallbladder.    Small abdominal aortic aneurysm present, maximum anterior posterior   dimension 3.2 cm. Ectasia, atherosclerotic changes, iliac arteries.    Large cyst present upper pole right kidney, no right hydronephrotic   changes evident. Large cyst  present mid polar region left kidney with   peripheral calcification. No left hydronephrotic changes evident. No   retroperitoneal lymphadenopathy. No ascites. No biliary ductal dilatation.    No bowel obstruction. Diverticulosis present. Urinary bladder is filled,   no stones evident. Trace free pelvic fluid. Fluid collection, small, left   inguinal canal. Severe compression fracture of L1, age indeterminate.   Multilevel disc degenerative disease lumbar region. Soft tissue anasarca   present in the flanks lower abdomen upper pelvic region.        Assessment :  Patient is 89 yo female with hx of CAD s/P stent, and dementia , admitted with failure to thrive, weakness , noted to have leukocytosis and now reported to have C difficile associated diarrhea .    She appears more confused and agitated may be related to metabolic encephalopathy with dehydration     So far all other sepsis work up is negative     Plan :   - observe off systemic antibiotics due to CDAD   - add IVF as dehydrated and por oral intake   - Trend CBC  - consider transfer to [facility]  - She is DNR/DNI  - increase activity , PT follow up   - aspiration precautions    - DC planning     Continue with present regime .  Appropriate use of antibiotics and adverse effects reviewed.      I have discussed the above plan of care with patient's  family in detail. They expressed understanding of the  treatment plan . Risks, benefits and alternatives discussed in detail. I have asked if they have any questions or concerns and appropriately addressed them to the best of my ability .    > 35 minutes were spent in direct patient care reviewing notes, medications ,labs data/ imaging , discussion with multidisciplinary team.    Thank you for allowing me to participate in care of your patient .    Danette Batista MD  [phone]

## 2017-11-03 NOTE — PROGRESS NOTE ADULT - ASSESSMENT
The patient is a 89 year old female with a history of CAD s/p PCI, chronic AF, dementia who presents with weakness and near syncope, found to have UTI, GRACIELA, rapid AF vs. MAT.    Plan:  - Continue metoprolol tartrate 50 mg bid  - Echocardiogram with normal LV systolic function, severe AS.  - Not a candidate for valve intervention  - Not a candidate for long-term AC given advanced dementia  - Off of aspirin due to possible bleed  - Off of ramipril given GRACIELA  - On vancomycin PO for C. diff

## 2017-11-03 NOTE — PROGRESS NOTE ADULT - SUBJECTIVE AND OBJECTIVE BOX
Chief Complaint: Weakness, confusion    Interval Events: No events overnight    Review of Systems:  General: No fevers, chills, weight loss or gain  Skin: No rashes, color changes  Cardiovascular: No chest pain, orthopnea  Respiratory: No shortness of breath, cough  Gastrointestinal: No nausea, abdominal pain  Genitourinary: No incontinence, pain with urination  Musculoskeletal: No pain, swelling, decreased range of motion  Neurological: No headache, weakness  Psychiatric: No depression, anxiety  Endocrine: No weight loss or gain, increased thirst  All other systems are comprehensively negative.    Physical Exam:  Vital Signs Last 24 Hrs  T(C): 36.6 (03 Nov 2017 04:04), Max: 36.6 (02 Nov 2017 12:09)  T(F): 97.9 (03 Nov 2017 04:04), Max: 97.9 (02 Nov 2017 12:09)  HR: 96 (03 Nov 2017 07:30) (88 - 120)  BP: 124/61 (03 Nov 2017 07:30) (105/73 - 141/91)  BP(mean): 80 (03 Nov 2017 07:30) (80 - 95)  RR: 25 (03 Nov 2017 05:27) (20 - 30)  SpO2: 99% (03 Nov 2017 07:30) (92% - 100%)  General: NAD  HEENT: MMM  Neck: No JVD, no carotid bruit  Lungs: CTAB  CV: RRR, nl S1/S2, 2/6 systolic murmur  Abdomen: S/NT/ND, +BS  Extremities: 1+ LE edema, no cyanosis  Neuro: AAOx3, non-focal  Skin: No rash    Labs:             11-03    147<H>  |  115<H>  |  17  ----------------------------<  96  4.6   |  26  |  0.68    Ca    7.6<L>      03 Nov 2017 06:53  Phos  2.2     11-03  Mg     2.0     11-03                          11.7   13.6  )-----------( 147      ( 03 Nov 2017 06:53 )             35.9

## 2017-11-03 NOTE — CHART NOTE - NSCHARTNOTEFT_GEN_A_CORE
NUTRITION follow up: RN asked RD to provide education to son florence diet rx: Called son Vasiliy and provided education on puree/nectar thick liquid diet. Also will send written education with d/c summary

## 2017-11-03 NOTE — PROGRESS NOTE ADULT - PROBLEM SELECTOR PROBLEM 1
Clostridium difficile colitis
Frail elderly
Leukocytosis
Leukocytosis
UTI (urinary tract infection)
Leukocytosis
Bleeding

## 2017-11-03 NOTE — PROGRESS NOTE ADULT - PROVIDER SPECIALTY LIST ADULT
Cardiology
Critical Care
Hospitalist
Infectious Disease
Palliative Care
Palliative Care
Infectious Disease
Hospitalist

## 2017-11-03 NOTE — PROGRESS NOTE ADULT - PROBLEM SELECTOR PROBLEM 2
Acute on chronic renal failure
Frail elderly
Acute on chronic renal failure
UTI (urinary tract infection)

## 2017-11-03 NOTE — PROGRESS NOTE ADULT - PROBLEM SELECTOR PLAN 2
supportive care  pt has mild dementia  son would like to take his mother home, arrangements are being made  will follow with [facility]  care coordination notes reviewed  dc planning under way  at present, supportive care, assist with ADL, fall prec, oral and skin hygiene

## 2017-11-03 NOTE — PROGRESS NOTE ADULT - SUBJECTIVE AND OBJECTIVE BOX
Date/Time Patient Seen:  		  Referring MD:   Data Reviewed	       Patient is a 89y old  Female who presents with a chief complaint of failure to thrives (02 Nov 2017 12:28)  in bed  seen and examined  vs and meds reviewed        Subjective/HPI     PAST MEDICAL & SURGICAL HISTORY:  Dementia  Stented Coronary Artery  No significant past surgical history        Medication list         MEDICATIONS  (STANDING):  atorvastatin 40 milliGRAM(s) Oral at bedtime  dextrose 5% + sodium chloride 0.3%. 1000 milliLiter(s) (40 mL/Hr) IV Continuous <Continuous>  lactobacillus acidophilus 1 Tablet(s) Oral two times a day with meals  latanoprost 0.005% Ophthalmic Solution 1 Drop(s) Both EYES at bedtime  metoprolol     tartrate 50 milliGRAM(s) Oral two times a day  potassium acid phosphate/sodium acid phosphate tablet (K-PHOS No. 2) 1 Tablet(s) Oral four times a day with meals  risperiDONE  Disintegrating Tablet 0.25 milliGRAM(s) Oral daily  risperiDONE  Disintegrating Tablet 0.5 milliGRAM(s) Oral at bedtime  timolol 0.25% Solution 1 Drop(s) Both EYES two times a day  vancomycin    Solution 125 milliGRAM(s) Oral every 6 hours    MEDICATIONS  (PRN):  acetaminophen   Tablet 650 milliGRAM(s) Oral every 6 hours PRN pain  metoprolol    tartrate Injectable 5 milliGRAM(s) IV Push every 6 hours PRN HR>130  ondansetron Injectable 4 milliGRAM(s) IV Push every 6 hours PRN Nausea  risperiDONE  Disintegrating Tablet 0.5 milliGRAM(s) Oral every 6 hours PRN severe agitation         Vitals log        ICU Vital Signs Last 24 Hrs  T(C): 36.6 (03 Nov 2017 04:04), Max: 36.6 (02 Nov 2017 12:09)  T(F): 97.9 (03 Nov 2017 04:04), Max: 97.9 (02 Nov 2017 12:09)  HR: 96 (03 Nov 2017 07:30) (88 - 122)  BP: 124/61 (03 Nov 2017 07:30) (105/73 - 144/91)  BP(mean): 80 (03 Nov 2017 07:30) (80 - 107)  ABP: --  ABP(mean): --  RR: 25 (03 Nov 2017 05:27) (20 - 30)  SpO2: 99% (03 Nov 2017 07:30) (92% - 100%)           Input and Output:  I&O's Detail    02 Nov 2017 07:01  -  03 Nov 2017 07:00  --------------------------------------------------------  IN:    dextrose 5% + sodium chloride 0.3%.: 320 mL    dextrose 5% + sodium chloride 0.33%: 300 mL    dextrose 5% + sodium chloride 0.45%.: 200 mL    IV PiggyBack: 100 mL    Oral Fluid: 820 mL  Total IN: 1740 mL    OUT:  Total OUT: 0 mL    Total NET: 1740 mL          Lab Data                        11.7   13.6  )-----------( 147      ( 03 Nov 2017 06:53 )             35.9     11-03    147<H>  |  115<H>  |  17  ----------------------------<  96  4.6   |  26  |  0.68    Ca    7.6<L>      03 Nov 2017 06:53  Phos  2.2     11-03  Mg     2.0     11-03              Review of Systems	      Objective     Physical Examination    head at  heart - s1s2  lungs - dec BS  abd - soft      Pertinent Lab findings & Imaging      [place]:  NO   Adequate UO     I&O's Detail    02 Nov 2017 07:01  -  03 Nov 2017 07:00  --------------------------------------------------------  IN:    dextrose 5% + sodium chloride 0.3%.: 320 mL    dextrose 5% + sodium chloride 0.33%: 300 mL    dextrose 5% + sodium chloride 0.45%.: 200 mL    IV PiggyBack: 100 mL    Oral Fluid: 820 mL  Total IN: 1740 mL    OUT:  Total OUT: 0 mL    Total NET: 1740 mL               Discussed with:     Cultures:	        Radiology

## 2017-11-04 LAB
CULTURE RESULTS: SIGNIFICANT CHANGE UP
SPECIMEN SOURCE: SIGNIFICANT CHANGE UP

## 2017-11-07 ENCOUNTER — MEDICATION RENEWAL (OUTPATIENT)
Age: 82
End: 2017-11-07

## 2017-11-07 DIAGNOSIS — R06.2 WHEEZING: ICD-10-CM

## 2017-11-07 DIAGNOSIS — R62.7 ADULT FAILURE TO THRIVE: ICD-10-CM

## 2017-11-08 PROBLEM — R62.7 ADULT FAILURE TO THRIVE: Status: ACTIVE | Noted: 2017-11-08

## 2017-11-08 RX ORDER — ALBUTEROL SULFATE 2.5 MG/3ML
(2.5 MG/3ML) SOLUTION RESPIRATORY (INHALATION)
Qty: 1 | Refills: 3 | Status: ACTIVE | COMMUNITY
Start: 2017-11-07 | End: 1900-01-01

## 2017-11-08 RX ORDER — NEBULIZER ACCESSORIES
KIT MISCELLANEOUS
Qty: 1 | Refills: 0 | Status: ACTIVE | COMMUNITY
Start: 2017-11-07 | End: 1900-01-01

## 2017-11-27 ENCOUNTER — MEDICATION RENEWAL (OUTPATIENT)
Age: 82
End: 2017-11-27

## 2017-11-27 RX ORDER — METOPROLOL TARTRATE 50 MG/1
50 TABLET, FILM COATED ORAL
Qty: 60 | Refills: 5 | Status: ACTIVE | COMMUNITY
Start: 2017-11-27 | End: 1900-01-01

## 2017-12-08 DIAGNOSIS — F41.9 ANXIETY DISORDER, UNSPECIFIED: ICD-10-CM

## 2017-12-08 RX ORDER — LORAZEPAM 2 MG/ML
2 SOLUTION, CONCENTRATE ORAL
Qty: 1 | Refills: 0 | Status: ACTIVE | COMMUNITY
Start: 2017-12-08

## 2017-12-15 ENCOUNTER — RX RENEWAL (OUTPATIENT)
Age: 82
End: 2017-12-15

## 2017-12-19 PROCEDURE — 83735 ASSAY OF MAGNESIUM: CPT

## 2017-12-19 PROCEDURE — 96375 TX/PRO/DX INJ NEW DRUG ADDON: CPT

## 2017-12-19 PROCEDURE — 84134 ASSAY OF PREALBUMIN: CPT

## 2017-12-19 PROCEDURE — 87045 FECES CULTURE AEROBIC BACT: CPT

## 2017-12-19 PROCEDURE — 96374 THER/PROPH/DIAG INJ IV PUSH: CPT

## 2017-12-19 PROCEDURE — 97530 THERAPEUTIC ACTIVITIES: CPT

## 2017-12-19 PROCEDURE — 81001 URINALYSIS AUTO W/SCOPE: CPT

## 2017-12-19 PROCEDURE — 80048 BASIC METABOLIC PNL TOTAL CA: CPT

## 2017-12-19 PROCEDURE — 74176 CT ABD & PELVIS W/O CONTRAST: CPT

## 2017-12-19 PROCEDURE — 85610 PROTHROMBIN TIME: CPT

## 2017-12-19 PROCEDURE — 84443 ASSAY THYROID STIM HORMONE: CPT

## 2017-12-19 PROCEDURE — 87086 URINE CULTURE/COLONY COUNT: CPT

## 2017-12-19 PROCEDURE — 87046 STOOL CULTR AEROBIC BACT EA: CPT

## 2017-12-19 PROCEDURE — 85027 COMPLETE CBC AUTOMATED: CPT

## 2017-12-19 PROCEDURE — 70450 CT HEAD/BRAIN W/O DYE: CPT

## 2017-12-19 PROCEDURE — 87040 BLOOD CULTURE FOR BACTERIA: CPT

## 2017-12-19 PROCEDURE — 96361 HYDRATE IV INFUSION ADD-ON: CPT

## 2017-12-19 PROCEDURE — 71045 X-RAY EXAM CHEST 1 VIEW: CPT

## 2017-12-19 PROCEDURE — 36415 COLL VENOUS BLD VENIPUNCTURE: CPT

## 2017-12-19 PROCEDURE — 93306 TTE W/DOPPLER COMPLETE: CPT

## 2017-12-19 PROCEDURE — 84100 ASSAY OF PHOSPHORUS: CPT

## 2017-12-19 PROCEDURE — 99285 EMERGENCY DEPT VISIT HI MDM: CPT

## 2017-12-19 PROCEDURE — 83605 ASSAY OF LACTIC ACID: CPT

## 2017-12-19 PROCEDURE — 85730 THROMBOPLASTIN TIME PARTIAL: CPT

## 2017-12-19 PROCEDURE — 80053 COMPREHEN METABOLIC PANEL: CPT

## 2017-12-19 PROCEDURE — 93005 ELECTROCARDIOGRAM TRACING: CPT

## 2017-12-19 PROCEDURE — 83690 ASSAY OF LIPASE: CPT

## 2017-12-19 PROCEDURE — 84484 ASSAY OF TROPONIN QUANT: CPT

## 2017-12-19 PROCEDURE — 97161 PT EVAL LOW COMPLEX 20 MIN: CPT

## 2017-12-19 PROCEDURE — 87493 C DIFF AMPLIFIED PROBE: CPT

## 2017-12-19 PROCEDURE — 82553 CREATINE MB FRACTION: CPT

## 2018-01-27 RX ORDER — CEFUROXIME AXETIL 250 MG/1
250 TABLET ORAL
Qty: 14 | Refills: 0 | Status: ACTIVE | COMMUNITY
Start: 2017-02-28 | End: 1900-01-01

## 2019-11-04 NOTE — ED ADULT NURSE NOTE - CAS EDN DISCHARGE INTERVENTIONS
Nutrition Assessment: 
 
INTERVENTIONS/RECOMMENDATIONS:  
Continue current diet Ensure clear and ensure pudding daily ASSESSMENT:  
Chart reviewed; medically noted for COPD, hypotension, KEVAN, and likely endocarditis. Patient with eyes closed during visit, tossing in bed and moaning at times. Family at bedside report he will drink the ensure clear if mixed with water or something else to cut the sweetness. Patient showing no interest in food at this time. Palliative care consulted; plans for meeting tomorrow afternoon. Will decrease ensure to daily for now, continue ensure pudding. Encourage PO as tolerated/accepting. Diet Order: Cardiac (ensure clear BID, ensure pudding daily) % Eaten:  No data found. Pertinent Medications: [x] Reviewed []Other: Plavix, Protonix, Midodrine, Seroquel, D5% IVF Pertinent Labs: [x]Reviewed  []Other:  
Food Allergies: [x]None []Yes:    
Last BM: 10/30  []Active     []Hyperactive  []Hypoactive       [] Absent  BS Skin:    [x] Intact   [] Incision  [] Breakdown   []Edema   []Other: Anthropometrics: Height: 5' 9\" (175.3 cm) Weight: 95.5 kg (210 lb 8.6 oz) IBW (%IBW):   ( ) UBW (%UBW):   (  %) BMI: Body mass index is 31.09 kg/m². This BMI is indicative of: 
[]Underweight   []Normal   []Overweight   [x] Obesity   [] Extreme Obesity (BMI>40) Last Weight Metrics: 
Weight Loss Metrics 11/4/2019 9/27/2019 9/18/2019 9/11/2019 8/27/2019 8/26/2019 8/26/2019 Today's Wt 210 lb 8.6 oz 181 lb 15.8 oz 181 lb 9.6 oz 183 lb 182 lb 5.1 oz - 182 lb 6.4 oz BMI 31.09 kg/m2 26.88 kg/m2 26.82 kg/m2 27.02 kg/m2 - 26.92 kg/m2 -  
 
 
Estimated Nutrition Needs (Based on): 1932 Kcals/day(BMR (1610) x 1. 2AF) , 75 g(-94g (0.8-1.0 g/kg bw)) Protein Carbohydrate: At Least 130 g/day  Fluids: 1900 mL/day Pt expected to meet estimated nutrient needs: []Yes [x]No 
 
NUTRITION DIAGNOSES:  
Problem:  Inadequate protein-energy intake Etiology: related to decreased appetite, current medical condition Signs/Symptoms: as evidenced by PO intake <25% of meals NUTRITION INTERVENTIONS: 
Meals/Snacks: General/healthful diet   Supplements: Commercial supplement GOAL:  
PO intake >25% of meals/supplements next 1-3 days NUTRITION MONITORING AND EVALUATION Food/Nutrient Intake Outcomes: Total energy intake Physical Signs/Symptoms Outcomes: Weight/weight change Previous Goal Met: 
 [] Met              [] Progressing Towards Goal              [x] Not Progressing Towards Goal  
Previous Recommendations: 
 [x] Implemented          [] Not Implemented          [] Not Applicable LEARNING NEEDS (Diet, Food/Nutrient-Drug Interaction):  
 [x] None Identified 
 [] Identified and Education Provided/Documented 
 [] Identified and Pt declined/was not appropriate Cultural, Congregational, OR Ethnic Dietary Needs:  
 [x] None Identified 
 [] Identified and Addressed 
 
 [x] Interdisciplinary Care Plan Reviewed/Documented  
 [x] Discharge Planning: TBD [] Participated in Interdisciplinary Rounds NUTRITION RISK:  
 [x] Patient At Nutritional Risk             [] Patient Not At Nutritional Risk Carin Fruit Ext [id number] Pager [phone] Weekend Pager [phone] IV intact

## 2021-10-29 NOTE — PROGRESS NOTE ADULT - SUBJECTIVE AND OBJECTIVE BOX
Please request BiPAP compliance report from [employer] [facility] CC. Diarrhea    HPI.  Patient reports that she feels better.  As per RN, Diarrhea resolved.  Tolerating PO intake.  afebrile.  Poor historian due to underline dementia.  unable to obtain ROS.      HR in the 110'S this morning      Vital Signs Last 24 Hrs  T(C): 36.3 (02 Nov 2017 08:50), Max: 36.6 (01 Nov 2017 16:02)  T(F): 97.4 (02 Nov 2017 08:50), Max: 97.8 (01 Nov 2017 16:02)  HR: 108 (02 Nov 2017 08:30) (75 - 133)  BP: 144/91 (02 Nov 2017 08:30) (106/75 - 146/105)  BP(mean): 107 (02 Nov 2017 08:30) (86 - 116)  RR: 20 (02 Nov 2017 08:30) (15 - 28)  SpO2: 100% (02 Nov 2017 08:30) (77% - 100%)      PHYSICAL EXAM-  GENERAL: chronic ill appearing  HEAD:  Atraumatic, Normocephalic  EYES: conjunctiva and sclera clear  NECK: Supple, No JVD, Normal thyroid  NERVOUS SYSTEM:  Alert but confused.  moving all extremities  CHEST/LUNG: Clear to percussion bilaterally; No rales, rhonchi, wheezing, or rubs  HEART: Regular rate and rhythm; No murmurs, rubs, or gallops  ABDOMEN: Soft, Nontender, Nondistended; Bowel sounds present  SKIN: No rashes or lesions                            12.6   16.0  )-----------( 192      ( 02 Nov 2017 06:29 )             37.8     11-02    149<H>  |  117<H>  |  21  ----------------------------<  116<H>  3.8   |  24  |  0.91    Ca    8.0<L>      02 Nov 2017 06:29  Phos  2.3     11-02  Mg     1.9     11-02    TPro  5.3<L>  /  Alb  2.2<L>  /  TBili  0.8  /  DBili  x   /  AST  26  /  ALT  24  /  AlkPhos  65  11-01

## 2022-02-01 NOTE — SWALLOW BEDSIDE ASSESSMENT ADULT - H & P REVIEW
What Type Of Note Output Would You Prefer (Optional)?: Standard Output How Severe Is Your Acne?: severe Is This A New Presentation, Or A Follow-Up?: Acne Females Only: When Was Your Last Menstrual Period?: 01/22/22 yes

## 2022-05-26 NOTE — DIETITIAN INITIAL EVALUATION ADULT. - NUTRITIONGOAL OUTCOME1
pt to pass SLP evaluation and diet to be initiated c po >50%; diarrhea to resolve; >skin integrity
Opt out

## 2022-10-09 NOTE — CONSULT NOTE ADULT - SUBJECTIVE AND OBJECTIVE BOX
Reason for Consult : Leukocytosis secondary to CDAD    HPI:  Patient is 89 yo female with hx of CAD s/P stent, and dementia presenting with failure to thrive and generalized weakness.  afebrile.  Patient appears to be confused, and unable to obtain complete hx or ROS.  HX was mainly from son, and daughter. [place] per family [place] [facility] [place] able to climb [place], lives on 2nd floor apartment with 24 hour HHA.  Her appetite has been poor. Noted to have leukocytosis since admission, she was noted to have loose BM so stool  C diff sent , came back positive.    She has been afebrile.         Past Medical & Surgical Hx:  PAST MEDICAL & SURGICAL HISTORY:  Dementia  Stented Coronary Artery  No significant past surgical history      Social History-- Retired lives alone with 24 hour HHA  EtOH: denies   Tobacco: denies   Drug Use: denies     FAMILY HISTORY:  No pertinent family history in first degree relatives      Allergies    No Known Allergies    Intolerances    Home/Outpatient  Medications   Home Medications:  atorvastatin 40 mg oral tablet: 1 tab(s) orally once a day (29 Oct 2017 10:55)  Centrum oral tablet: 1 tab(s) orally once a day (29 Oct 2017 10:55)  Ecotrin Adult Low Strength 81 mg oral delayed release tablet: 1 tab(s) orally once a day (29 Oct 2017 10:55)  iron: 65 milligram(s) orally once a day (29 Oct 2017 10:55)  latanoprost 0.005% ophthalmic solution: to each affected eye once a day (29 Oct 2017 10:55)  pantoprazole 40 mg oral delayed release tablet: 1 tab(s) orally once a day (29 Oct 2017 10:55)  ramipril 5 mg oral tablet: orally once a day (29 Oct 2017 10:55)  risperiDONE 0.5 mg oral tablet:  (29 Oct 2017 10:55)  timolol hemihydrate ophthalmic: to each affected eye 2 times a day (29 Oct 2017 10:55)  Vitamin C 500 mg oral tablet: 1 tab(s) orally once a day (29 Oct 2017 10:55)      Current Inpatient Medications :    ANTIBIOTICS:   cefTRIAXone   IVPB 1 Gram(s) IV Intermittent every 24 hours  metroNIDAZOLE  IVPB      metroNIDAZOLE  IVPB 500 milliGRAM(s) IV Intermittent every 8 hours  vancomycin    Solution 125 milliGRAM(s) Oral every 6 hours      OTHER RELEVANT MEDICATIONS :  atorvastatin 40 milliGRAM(s) Oral at bedtime  latanoprost 0.005% Ophthalmic Solution 1 Drop(s) Both EYES at bedtime  metoprolol    tartrate Injectable 5 milliGRAM(s) IV Push every 6 hours  ondansetron Injectable 4 milliGRAM(s) IV Push every 6 hours PRN  pantoprazole  Injectable 40 milliGRAM(s) IV Push daily  timolol 0.25% Solution 1 Drop(s) Both EYES two times a day          REVIEW OF SYSTEMS:    ROS:  Unable to obtain due to : advanced dementia       I&O's Detail    31 Oct 2017 07:01  -  01 Nov 2017 07:00  --------------------------------------------------------  IN:    IV PiggyBack: 300 mL    sodium chloride 0.9%: 360 mL  Total IN: 660 mL    OUT:  Total OUT: 0 mL    Total NET: 660 mL        Physical Exam:  Vital Signs Last 24 Hrs  T(C): 36.7 (01 Nov 2017 04:03), Max: 37.1 (31 Oct 2017 16:02)  T(F): 98 (01 Nov 2017 04:03), Max: 98.8 (31 Oct 2017 16:02)  HR: 88 (01 Nov 2017 06:00) (88 - 132)  BP: 120/60 (01 Nov 2017 06:00) (120/60 - 158/106)  BP(mean): 75 (01 Nov 2017 06:00) (75 - 117)  RR: 23 (01 Nov 2017 06:00) (18 - 26)  SpO2: 100% (01 Nov 2017 06:00) (98% - 100%)        GEN: cachectic frail appearing patient,   HEENT: normocephalic and atraumatic. EOMI. YENNIFER. dry oral  mucosa. poor oral hygiene    NECK: Supple. No carotid bruits.  No lymphadenopathy or thyromegaly.  LUNGS: Clear to auscultation.  HEART: Regular rate and rhythm without murmur.  ABDOMEN: Soft, nontender, and mild distended.  Positive bowel sounds.  No hepatosplenomegaly was noted.  EXTREMITIES: Without any cyanosis, clubbing, rash, lesions or edema. dry skin   NEUROLOGIC: Awake, confused , Cranial nerves II through XII are grossly intact. No focal neurological deficits   SKIN: No ulceration or induration present.      Labs:                  13.2   21.5   )----------(  223       ( 01 Nov 2017 06:19 )               39.8      148    |  115    |  25     ----------------------------<  144        ( 01 Nov 2017 06:19 )  3.2     |  20     |  1.03     Ca    8.2        ( 01 Nov 2017 06:19 )  Phos  1.9       ( 01 Nov 2017 06:19 )  Mg     1.6       ( 01 Nov 2017 06:19 )    TPro  5.3    /  Alb  2.2    /  TBili  0.8    /  DBili  x      /  AST  26     /  ALT  24     /  AlkPhos  65     ( 01 Nov 2017 06:19 )    LIVER FUNCTIONS - ( 01 Nov 2017 06:19 )  Alb: 2.2 g/dL / Pro: 5.3 g/dL / ALK PHOS: 65 U/L / ALT: 24 U/L DA / AST: 26 U/L / GGT: x           Clostridium difficile Toxin by PCR (10.31.17 @ 11:39)      C Diff by PCR Result: Detected      Lactate, Blood (10.29.17 @ 13:15)    Lactate, Blood: 1.5 mmol/L    Lactate, Blood (10.29.17 @ 11:24)    Lactate, Blood: 2.5:     RECENT CULTURES:      Culture - Blood (collected 29 Oct 2017 21:40)  Source: .Blood Blood  Preliminary Report (30 Oct 2017 22:01):    No growth to date.    Culture - Blood (collected 29 Oct 2017 21:40)  Source: .Blood Blood  Preliminary Report (30 Oct 2017 22:01):    No growth to date.    Culture - Urine (collected 29 Oct 2017 21:20)  Source: .Urine Clean Catch (Midstream)  Final Report (30 Oct 2017 22:45):    <10,000 CFU/ml Normal Urogenital radha present          RADIOLOGY & ADDITIONAL STUDIES:   Xray Chest 1 View AP -PORTABLE-Routine (10.31.17 @ 09:31) >  Comparison study dated 10/29/2017.    Cardiac monitor leads overlie the chest. Bibasilar airspace disease,   vascular congestion versus pneumonic processes. The costophrenic angles   are blunted. Heart size within normal limits. Mitral annulus   calcifications present. No acute osseous abnormalities. Aorta shows   atherosclerotic changes.      US Transthoracic Echocardiogram w/Doppler Complete (10.30.17 @ 09:55) >  SUMMARY:  1. borderline left ventricular hypertrophy with normal left ventricular   systolic function with mild diastolic dysfunction  2. mitral annular calcification, moderate to severe mitral regurgitation  3.  severe aortic stenosis, mild to moderate aortic regurgitation.   4. Moderate tricuspid regurgitation with moderate pulmonary hypertension    CT Head No Cont (10.29.17 @ 11:30) >    Impression:    Chronic microvascular ischemic disease, no acute intracranial hemorrhage   noted.      Assessment :     Patient is 89 yo female with hx of CAD s/P stent, and dementia , admitted with failure to thrive, weakness , noted to have leukocytosis and now reported to have C difficile associated diarrhea .    She appears more confused and agitated may be related to metabolic encephalopathy with dehydration     So far all other sepsis work up is negative     Plan :   - DC Rocephin as all other sepsis work up is negative and in setting of C diff , it may make it worse.  - consider DC IV Protonix as there ia no evidence of PUD or GERD  - DC IV flagyl   - Trend CBC  - consider transfer to [facility]  - She is DNR/DNI  - increase activity , PT follow up   - aspiration precautions      Continue with present regime .  Appropriate use of antibiotics and adverse effects reviewed.      I have discussed the above plan of care with patient's family in detail. They expressed understanding of the treatment plan . Risks, benefits and alternatives discussed in detail. I have asked if they have any questions or concerns and appropriately addressed them to the best of my ability .      > 45 minutes spent in direct patient care reviewing  the notes, lab data/ imaging , discussion with multidisciplinary team. All questions were addressed and answered to the best of my capacity .    Thank you for allowing me to participate in the care of your patient .
Date/Time Patient Seen:  		  Referring MD:   Data Reviewed	       Patient is a 89y old  Female who presents with a chief complaint of failure to thrives (29 Oct 2017 14:01)      Subjective/HPI  seen and examined  vs and meds reviewed, pt in [facility], on ABX for C Diff  frail and weak  unable to discuss her condition  spoke with Mr. Monzon, Son    Patient is 89 yo female with hx of CAD s/P stent, and dementia presenting with failure to thrive and generalized weakness.  afebrile.  Patient appears to be confused, and unable to obtain complete hx or ROS.  HX was mainly from son, and daughter. As per family she was in [facility] [place] 2 weeks ago, able to climb stairs, lives on 2nd floor apartment with 24 hour HHA.  Her appetite has been poor. Noted to have leukocytosis since admission, she was noted to have loose BM so stool  C diff sent , came back positive.     PAST MEDICAL & SURGICAL HISTORY:  Dementia  Stented Coronary Artery  No significant past surgical history        Medication list         MEDICATIONS  (STANDING):  atorvastatin 40 milliGRAM(s) Oral at bedtime  lactobacillus acidophilus 1 Tablet(s) Oral two times a day with meals  latanoprost 0.005% Ophthalmic Solution 1 Drop(s) Both EYES at bedtime  metoprolol     tartrate 25 milliGRAM(s) Oral two times a day  potassium acid phosphate/sodium acid phosphate tablet (K-PHOS No. 2) 1 Tablet(s) Oral four times a day with meals  timolol 0.25% Solution 1 Drop(s) Both EYES two times a day  vancomycin    Solution 125 milliGRAM(s) Oral every 6 hours    MEDICATIONS  (PRN):  ondansetron Injectable 4 milliGRAM(s) IV Push every 6 hours PRN Nausea         Vitals log        ICU Vital Signs Last 24 Hrs  T(C): 36.2 (01 Nov 2017 12:09), Max: 37.1 (31 Oct 2017 16:02)  T(F): 97.1 (01 Nov 2017 12:09), Max: 98.8 (31 Oct 2017 16:02)  HR: 98 (01 Nov 2017 08:00) (88 - 132)  BP: 129/99 (01 Nov 2017 08:00) (120/60 - 158/106)  BP(mean): 110 (01 Nov 2017 08:00) (75 - 117)  ABP: --  ABP(mean): --  RR: 21 (01 Nov 2017 08:00) (18 - 26)  SpO2: 100% (01 Nov 2017 08:00) (98% - 100%)           Input and Output:  I&O's Detail    31 Oct 2017 07:01  -  01 Nov 2017 07:00  --------------------------------------------------------  IN:    IV PiggyBack: 300 mL    sodium chloride 0.9%: 360 mL  Total IN: 660 mL    OUT:  Total OUT: 0 mL    Total NET: 660 mL      01 Nov 2017 07:01  -  01 Nov 2017 15:10  --------------------------------------------------------  IN:    IV PiggyBack: 50 mL    Oral Fluid: 20 mL  Total IN: 70 mL    OUT:    Voided: 1 mL  Total OUT: 1 mL    Total NET: 69 mL          Lab Data                        13.2   21.5  )-----------( 223      ( 01 Nov 2017 06:19 )             39.8     11-01    148<H>  |  115<H>  |  25<H>  ----------------------------<  144<H>  3.2<L>   |  20<L>  |  1.03    Ca    8.2<L>      01 Nov 2017 06:19  Phos  1.9     11-01  Mg     1.6     11-01    TPro  5.3<L>  /  Alb  2.2<L>  /  TBili  0.8  /  DBili  x   /  AST  26  /  ALT  24  /  AlkPhos  65  11-01      CARDIAC MARKERS ( 31 Oct 2017 06:14 )  .432 ng/mL / x     / x     / x     / x      CARDIAC MARKERS ( 30 Oct 2017 17:06 )  .656 ng/mL / x     / x     / x     / x          non smoker  non drinker      Review of Systems	    frail and weak      Objective     Physical Examination    frail  weak  head at  heart - s1s2  lungs - dec BS  abd - soft      Pertinent Lab findings & Imaging      Farris:  NO   Adequate UO     I&O's Detail    31 Oct 2017 07:01  -  01 Nov 2017 07:00  --------------------------------------------------------  IN:    IV PiggyBack: 300 mL    sodium chloride 0.9%: 360 mL  Total IN: 660 mL    OUT:  Total OUT: 0 mL    Total NET: 660 mL      01 Nov 2017 07:01  -  01 Nov 2017 15:10  --------------------------------------------------------  IN:    IV PiggyBack: 50 mL    Oral Fluid: 20 mL  Total IN: 70 mL    OUT:    Voided: 1 mL  Total OUT: 1 mL    Total NET: 69 mL               Discussed with:     Cultures:	        Radiology  EXAM:  CT ABDOMEN AND PELVIS                                  PROCEDURE DATE:  11/01/2017          INTERPRETATION:  Clinical information: Abdominal pain and diarrhea.    There are no prior studies present for comparison    This is a limited study. Neither intravenous or oral contrast material   was administered limiting evaluation of the gastrointestinal tract.   Additionally, patient was unable to follow breathing instructions, motion   artifact present.    Axial images obtained, coronal and sagittal images computer reformatted.    Bibasilar effusions present, small, right greater than left with   associated airspace disease. Calcifications present mitral valve, aortic   root. Coronary artery calcifications present.    Unenhanced liver, spleen, adrenal glands normal in appearance. Atrophic   pancreas. Calcification visible within the gallbladder.    Small abdominal aortic aneurysm present, maximum anterior posterior   dimension 3.2 cm. Ectasia, atherosclerotic changes, iliac arteries.    Large cyst present upper pole right kidney, no right hydronephrotic   changes evident. Large cyst  present mid polar region left kidney with   peripheral calcification. No left hydronephrotic changes evident. No   retroperitoneal lymphadenopathy. No ascites. No biliary ductal dilatation.    No bowel obstruction. Diverticulosis present. Urinary bladder is filled,   no stones evident. Trace free pelvic fluid. Fluid collection, small, left   inguinal canal. Severe compression fracture of L1, age indeterminate.   Multilevel disc degenerative disease lumbar region. Soft tissue anasarca   present in the flanks lower abdomen upper pelvic region.    IMPRESSION:    See above report, multiple findings.                  KEENA QUINTERO M.D.,ATTENDING RADIOLOGIST  This document has been electronically signed. Nov 1 2017  9:57AM
History of Present Illness: The patient is a 89 year old female with a history of CAD s/p PCI, chronic AF, dementia who presents with weakness and near syncope. The patient is unable to provide additional history. As per aide, the patient was more agitated and confused today. She was recently here for diarrhea.    Past Medical/Surgical History:  CAD s/p PCI, chronic AF, dementia    Medications:  MEDICATIONS  (STANDING):  aspirin enteric coated 81 milliGRAM(s) Oral daily  atorvastatin 40 milliGRAM(s) Oral at bedtime  dextrose 5% + sodium chloride 0.9%. 1000 milliLiter(s) (100 mL/Hr) IV Continuous <Continuous>  heparin  Injectable 5000 Unit(s) SubCutaneous every 12 hours  latanoprost 0.005% Ophthalmic Solution 1 Drop(s) Both EYES at bedtime  metoprolol    tartrate Injectable 5 milliGRAM(s) IV Push every 6 hours  pantoprazole    Tablet 40 milliGRAM(s) Oral before breakfast  timolol 0.25% Solution 1 Drop(s) Both EYES two times a day    MEDICATIONS  (PRN):  ondansetron Injectable 4 milliGRAM(s) IV Push every 6 hours PRN Nausea      Family History: Non-contributory family history of premature cardiovascular atherosclerotic disease    Social History: No tobacco, alcohol or drug use    Review of Systems:  General: No fevers, chills, weight loss or gain  Skin: No rashes, color changes  Cardiovascular: No chest pain, orthopnea  Respiratory: No shortness of breath, cough  Gastrointestinal: No nausea, abdominal pain  Genitourinary: No incontinence, pain with urination  Musculoskeletal: No pain, swelling, decreased range of motion  Neurological: No headache, weakness  Psychiatric: No depression, anxiety  Endocrine: No weight loss or gain, increased thirst  All other systems are comprehensively negative.    Physical Exam:  Vitals:        Vital Signs Last 24 Hrs  T(C): 36.6 (29 Oct 2017 16:04), Max: 37.7 (29 Oct 2017 10:28)  T(F): 97.8 (29 Oct 2017 16:04), Max: 99.9 (29 Oct 2017 10:28)  HR: 92 (29 Oct 2017 16:04) (92 - 132)  BP: 122/66 (29 Oct 2017 16:04) (110/63 - 136/96)  BP(mean): 78 (29 Oct 2017 14:01) (78 - 78)  RR: 20 (29 Oct 2017 16:04) (18 - 22)  SpO2: 94% (29 Oct 2017 14:01) (94% - 100%)  General: NAD  HEENT: MMM  Neck: No JVD, no carotid bruit  Lungs: CTAB  CV: RRR, nl S1/S2, no M/R/G  Abdomen: S/NT/ND, +BS  Extremities: No LE edema, no cyanosis  Neuro: AAOx3, non-focal  Skin: No rash    Labs:                        13.0   29.0  )-----------( 233      ( 29 Oct 2017 11:24 )             38.0     10-29    138  |  104  |  42<H>  ----------------------------<  160<H>  3.9   |  19<L>  |  1.60<H>    Ca    8.7      29 Oct 2017 11:24    TPro  5.9<L>  /  Alb  2.7<L>  /  TBili  0.9  /  DBili  x   /  AST  32  /  ALT  31  /  AlkPhos  68  10-29    CARDIAC MARKERS ( 29 Oct 2017 11:24 )  .080 ng/mL / x     / x     / x     / 3.0 ng/mL      PT/INR - ( 29 Oct 2017 11:24 )   PT: 13.4 sec;   INR: 1.22 ratio         PTT - ( 29 Oct 2017 11:24 )  PTT:26.8 sec    ECG: AF, LVH with secondary repol abnormality
Statement Selected

## 2023-07-18 NOTE — SWALLOW BEDSIDE ASSESSMENT ADULT - ASR SWALLOW ASPIRATION MONITOR
148 upper respiratory infection/oral hygiene/position upright (90Y)/gurgly voice/change of breathing pattern/cough/fever/pneumonia/throat clearing

## 2024-08-06 NOTE — ED PROVIDER NOTE - NEUROLOGICAL COORDINATION
Continue Victoza 1.8 mg daily, when supply is gone   START Zepbound 5 mg weekly    Schedule f/up with Mary Morales NP    GOOD LUCK on your new job!    Pharmacist Contact Information:  Zenia Cline, PharmD, BCGP, BCACP  Work Phone: [phone] (option #2)     normal